# Patient Record
Sex: FEMALE | Race: WHITE | NOT HISPANIC OR LATINO | Employment: FULL TIME | URBAN - METROPOLITAN AREA
[De-identification: names, ages, dates, MRNs, and addresses within clinical notes are randomized per-mention and may not be internally consistent; named-entity substitution may affect disease eponyms.]

---

## 2018-01-10 ENCOUNTER — OFFICE VISIT (OUTPATIENT)
Dept: FAMILY MEDICINE | Facility: CLINIC | Age: 38
End: 2018-01-10
Payer: COMMERCIAL

## 2018-01-10 VITALS
HEART RATE: 88 BPM | DIASTOLIC BLOOD PRESSURE: 82 MMHG | SYSTOLIC BLOOD PRESSURE: 133 MMHG | OXYGEN SATURATION: 97 % | HEIGHT: 63 IN | BODY MASS INDEX: 35.51 KG/M2 | WEIGHT: 200.4 LBS | TEMPERATURE: 98 F

## 2018-01-10 DIAGNOSIS — L73.2 HIDRADENITIS SUPPURATIVA: Primary | ICD-10-CM

## 2018-01-10 DIAGNOSIS — N76.0 BACTERIAL VAGINOSIS: ICD-10-CM

## 2018-01-10 DIAGNOSIS — B96.89 BACTERIAL VAGINOSIS: ICD-10-CM

## 2018-01-10 DIAGNOSIS — N89.8 VAGINAL ODOR: ICD-10-CM

## 2018-01-10 LAB
SPECIMEN SOURCE: ABNORMAL
WET PREP SPEC: ABNORMAL

## 2018-01-10 PROCEDURE — 99203 OFFICE O/P NEW LOW 30 MIN: CPT | Performed by: FAMILY MEDICINE

## 2018-01-10 PROCEDURE — 87210 SMEAR WET MOUNT SALINE/INK: CPT | Performed by: FAMILY MEDICINE

## 2018-01-10 RX ORDER — METRONIDAZOLE 7.5 MG/G
1 GEL VAGINAL AT BEDTIME
Qty: 70 G | Refills: 0 | Status: SHIPPED | OUTPATIENT
Start: 2018-01-10 | End: 2018-01-15

## 2018-01-10 RX ORDER — DOXYCYCLINE HYCLATE 100 MG
100 TABLET ORAL 2 TIMES DAILY
Qty: 14 TABLET | Refills: 0 | Status: SHIPPED | OUTPATIENT
Start: 2018-01-10 | End: 2018-01-17

## 2018-01-10 NOTE — MR AVS SNAPSHOT
After Visit Summary   1/10/2018    Esther Nair    MRN: [id number]           Patient Information     Date Of Birth          1980        Visit Information        Provider Department      1/10/2018 2:00 PM Beba Oneal MD [facility]        Today's Diagnoses     Hidradenitis suppurativa    -  1    Vaginal odor          Care Instructions      Understanding Hidradenitis Suppurativa  Hidradenitis suppurativa is a long-term (chronic) skin disease. It causes painful bumps and sores (abscesses) to form around a hair follicle. Follicles are the tiny holes from which hair grows out of your skin. The disease occurs on parts of the body where skin rubs together. It most often appears in the armpits, the groin area, and under the breasts. It is more common in women.  How to say it  [id number]   What causes hidradenitis suppurativa?  This skin disease happens when hair follicles become clogged with keratin. Keratin is the protein that makes up your hair and nails. The follicles then burst and become infected. Pressure or rubbing on the skin can clog the follicles. Or it can further irritate them.  The disease tends to run in families. It s also more likely to occur in people who are obese, have diabetes, or smoke. Hormones may also play a part.  Symptoms of hidradenitis suppurativa  This skin disease causes one or more painful red bumps on the skin. These bumps become infected and drain pus. They may also itch or burn. In severe cases, sinus tracts may form. These are narrow channels that run under the skin. Blood or a bad-smelling pus may ooze from these bumps or sinus tracts. Bands of scarring often occur.  Treatment for hidradenitis suppurativa  Treatment for this skin disease is most successful when started early. But it may be hard to diagnose. It may be mistaken for other skin conditions. The painful bumps also often return. So stopping new bumps and limiting  scarring is important. Treatment options include:    Warm compress. Putting a warm, wet washcloth on the affected skin may help.    Lifestyle changes. Your symptoms may get better if you lose weight or stop smoking, if needed. Also avoid shaving or other irritants, such as deodorant or perfume.    Antibiotics. For mild cases, an antibiotic for the skin (topical) may help. You may need oral antibiotics if you have a severe case. They can help prevent further infection.    Other oral medicines. Over-the-counter pain medicines can ease pain and inflammation. You may need stronger medicines for a severe case. These medicines include corticosteroids or a retinoid. These may cause side effects.    Injected medicines. A steroid may be injected into the bump to ease pain. A biologic may be injected to ease severe symptoms.    Surgery. Surgery can drain and remove the painful bumps. For severe cases, the doctor may cut out the entire area of affected skin or destroy it with a laser.  Complications of hidradenitis suppurativa  These include:    Arthritis    Depression    Lymphedema    Scarring of skin    Skin cancer  When to call your healthcare provider  Call your healthcare provider right away if you have any of these:    Fever of 100.4 F (38 C) or higher, or as directed    Redness, swelling, or fluid leaking from your rash that gets worse    Pain that gets worse    Symptoms that don t get better, or get worse    New symptoms   Date Last Reviewed: 5/1/2016 2000-2017 [employer]. [address] [place] [address]. All rights reserved. This information is not intended as a substitute for professional medical care. Always follow your healthcare professional's instructions.                Follow-ups after your visit        Additional Services     DERMATOLOGY REFERRAL       Your provider has referred you to: FMASHWIN: [facility] [phone]    [email or web address]    Please be aware that coverage of these services is subject to the terms and limitations of your health insurance plan.  Call member services at your health plan with any benefit or coverage questions.      Please bring the following with you to your appointment:    (1) Any X-Rays, CTs or MRIs which have been performed.  Contact the facility where they were done to arrange for  prior to your scheduled appointment.    (2) List of current medications  (3) This referral request   (4) Any documents/labs given to you for this referral                  Follow-up notes from your care team     Return if symptoms worsen or fail to improve.      Who to contact     Normal or non-critical lab and imaging results will be communicated to you by Luxodohart, letter or phone within 4 business days after the clinic has received the results. If you do not hear from us within 7 days, please contact the clinic through MyChart or phone. If you have a critical or abnormal lab result, we will notify you by phone as soon as possible.  Submit refill requests through [employer] or call your pharmacy and they will forward the refill request to us. Please allow 3 business days for your refill to be completed.          If you need to speak with a  for additional information , please call: [phone]             Additional Information About Your Visit        [employer] Information     [employer] lets you send messages to your doctor, view your test results, renew your prescriptions, schedule appointments and more. To sign up, go to [email or web address] . Click on \"Log in\" on the left side of the screen, which will take you to the Welcome page. Then click on \"Sign up Now\" on the right side of the page.     You will be asked to enter the access code listed below, as well as some personal information. Please follow the directions to create your username and password.     Your access code " "is: [email or web address]  Expires: 4/10/2018  2:51 PM     Your access code will  in 90 days. If you need help or a new code, please call your [place] clinic or [phone].        Care EveryWhere ID     This is your Care EveryWhere ID. This could be used by other organizations to access your [place] medical records  [id number]        Your Vitals Were     Pulse Temperature Height Last Period Pulse Oximetry Breastfeeding?    88 98  F (36.7  C) (Tympanic) 5' 3\" (1.6 m) 2018 (Exact Date) 97% No    BMI (Body Mass Index)                   35.5 kg/m2            Blood Pressure from Last 3 Encounters:   01/10/18 133/82    Weight from Last 3 Encounters:   01/10/18 200 lb 6.4 oz (90.9 kg)              We Performed the Following     DERMATOLOGY REFERRAL     Wet prep          Today's Medication Changes          These changes are accurate as of: 1/10/18  2:51 PM.  If you have any questions, ask your nurse or doctor.               Start taking these medicines.        Dose/Directions    doxycycline 100 MG tablet   Commonly known as:  VIBRA-TABS   Used for:  Hidradenitis suppurativa   Started by:  Beba Oneal MD        Dose:  100 mg   Take 1 tablet (100 mg) by mouth 2 times daily for 7 days   Quantity:  14 tablet   Refills:  0            Where to get your medicines      These medications were sent to [facility] [address] AT [address] [facility] [address]     Phone:  [phone]     doxycycline 100 MG tablet                Primary Care Provider Office Phone # Fax #    [facility] [phone]       [address] [facility] [address]        Equal Access to Services     KEON GÓMEZ AH: Christina Powell, wacristiana anaya, qamaurice kaalmada julian, candi pulido So [facility] [phone].    ATENCIÓN: Si habla español, tiene a mora disposición servicios gratuitos de asistencia " lingüística. Claudine al [phone].    We comply with applicable federal civil rights laws and [place] laws. We do not discriminate on the basis of race, color, national origin, age, disability, sex, sexual orientation, or gender identity.            Thank you!     Thank you for choosing [facility]  for your care. Our goal is always to provide you with excellent care. Hearing back from our patients is one way we can continue to improve our services. Please take a few minutes to complete the written survey that you may receive in the mail after your visit with us. Thank you!             Your Updated Medication List - Protect others around you: Learn how to safely use, store and throw away your medicines at www.disposemymeds.org.          This list is accurate as of: 1/10/18  2:51 PM.  Always use your most recent med list.                   Brand Name Dispense Instructions for use Diagnosis    doxycycline 100 MG tablet    VIBRA-TABS    14 tablet    Take 1 tablet (100 mg) by mouth 2 times daily for 7 days    Hidradenitis suppurativa

## 2018-01-10 NOTE — PATIENT INSTRUCTIONS
Understanding Hidradenitis Suppurativa  Hidradenitis suppurativa is a long-term (chronic) skin disease. It causes painful bumps and sores (abscesses) to form around a hair follicle. Follicles are the tiny holes from which hair grows out of your skin. The disease occurs on parts of the body where skin rubs together. It most often appears in the armpits, the groin area, and under the breasts. It is more common in women.  How to say it  [id number]-NY-tis [id number]-ur-uh-BRANDI-vuh   What causes hidradenitis suppurativa?  This skin disease happens when hair follicles become clogged with keratin. Keratin is the protein that makes up your hair and nails. The follicles then burst and become infected. Pressure or rubbing on the skin can clog the follicles. Or it can further irritate them.  The disease tends to run in families. It s also more likely to occur in people who are obese, have diabetes, or smoke. Hormones may also play a part.  Symptoms of hidradenitis suppurativa  This skin disease causes one or more painful red bumps on the skin. These bumps become infected and drain pus. They may also itch or burn. In severe cases, sinus tracts may form. These are narrow channels that run under the skin. Blood or a bad-smelling pus may ooze from these bumps or sinus tracts. Bands of scarring often occur.  Treatment for hidradenitis suppurativa  Treatment for this skin disease is most successful when started early. But it may be hard to diagnose. It may be mistaken for other skin conditions. The painful bumps also often return. So stopping new bumps and limiting scarring is important. Treatment options include:    Warm compress. Putting a warm, wet washcloth on the affected skin may help.    Lifestyle changes. Your symptoms may get better if you lose weight or stop smoking, if needed. Also avoid shaving or other irritants, such as deodorant or perfume.    Antibiotics. For mild cases, an antibiotic for the skin (topical) may help. You  may need oral antibiotics if you have a severe case. They can help prevent further infection.    Other oral medicines. Over-the-counter pain medicines can ease pain and inflammation. You may need stronger medicines for a severe case. These medicines include corticosteroids or a retinoid. These may cause side effects.    Injected medicines. A steroid may be injected into the bump to ease pain. A biologic may be injected to ease severe symptoms.    Surgery. Surgery can drain and remove the painful bumps. For severe cases, the doctor may cut out the entire area of affected skin or destroy it with a laser.  Complications of hidradenitis suppurativa  These include:    Arthritis    Depression    Lymphedema    Scarring of skin    Skin cancer  When to call your healthcare provider  Call your healthcare provider right away if you have any of these:    Fever of 100.4 F (38 C) or higher, or as directed    Redness, swelling, or fluid leaking from your rash that gets worse    Pain that gets worse    Symptoms that don t get better, or get worse    New symptoms   Date Last Reviewed: 5/1/2016 2000-2017 [employer]. [address] [place] [address], [place] [address]. All rights reserved. This information is not intended as a substitute for professional medical care. Always follow your healthcare professional's instructions.

## 2018-01-10 NOTE — PROGRESS NOTES
SUBJECTIVE:   Esther Nair is a 37 year old female who presents to clinic today for the following health issues:    New patient to the clinic.   No previous medical records.     Derm Problem   Onset: x1 year    Description:   Location: upper legs, buttocks, lower abdomen  Character: boil-like masses, drainage, very painful  Itching (Pruritis): YES- when healing    Progression of Symptoms:  worsening    Accompanying Signs & Symptoms:  Fever: no   Body aches or joint pain: no   Sore throat symptoms: no   Recent cold symptoms: no     History:   Previous similar rash: YES- never fully heals     Precipitating factors:   Exposure to similar rash: no   New exposures: None   Recent travel: no     Alleviating factors:  3 in 1 cream     Therapies Tried and outcome: was previously seen at [facility] for this was given a cream (unsure of name) she did not use it.  Will typically use a OTC 3 in 1 cream for flares.       Vaginal Symptoms  Chronic issues with vaginal odor.  Does not currently have any discharge but does at times.  No urinary symptoms. Previously tried refresh and has also been given antibiotics for it as well but odor will return shortly after completing.         Problem list and histories reviewed & adjusted, as indicated.  Additional history: as documented    Patient Active Problem List   Diagnosis     Hidradenitis suppurativa     Vaginal odor     No past surgical history on file.    Social History   Substance Use Topics     Smoking status: Never Smoker     Smokeless tobacco: Never Used     Alcohol use Yes     No family history on file.      Current Outpatient Prescriptions   Medication Sig Dispense Refill     doxycycline (VIBRA-TABS) 100 MG tablet Take 1 tablet (100 mg) by mouth 2 times daily for 7 days 14 tablet 0     Not on File      Reviewed and updated as needed this visit by clinical staff     Reviewed and updated as needed this visit by Provider         ROS:  Constitutional, HEENT,  "cardiovascular, pulmonary, gi and gu systems are negative, except as otherwise noted.      OBJECTIVE:   /82  Pulse 88  Temp 98  F (36.7  C) (Tympanic)  Ht 5' 3\" (1.6 m)  Wt 200 lb 6.4 oz (90.9 kg)  LMP 01/01/2018 (Exact Date)  SpO2 97%  Breastfeeding? No  BMI 35.5 kg/m2  Body mass index is 35.5 kg/(m^2).  GENERAL: healthy, alert and no distress  ABDOMEN: soft, nontender, no hepatosplenomegaly, no masses and bowel sounds normal   (female): normal female external genitalia, normal urethral meatus, vaginal mucosa, normal cervix/adnexa/uterus without masses. Minimal whitish discharge.  SKIN: Multiple inflammatory nodules with scarring in various stages of healing in the inguinal, bilateral inner thigh, perianal and perineal region. No draining sinus tracts.     Diagnostic Test Results:  Wet prep in process    ASSESSMENT/PLAN:     Esther was seen today for derm problem.    Diagnoses and all orders for this visit:    Hidradenitis suppurativa  Patient education and Handout given  -     doxycycline (VIBRA-TABS) 100 MG tablet; Take 1 tablet (100 mg) by mouth 2 times daily for 7 days  -     DERMATOLOGY REFERRAL    Vaginal odor  -     Wet prep      Bacterial vaginosis  -     metroNIDAZOLE (METROGEL) 0.75 % vaginal gel; Place 1 applicator (5 g) vaginally At Bedtime for 5 days         Follow up if symptoms fail to improve or worsen.      The patient was in agreement with the plan today and had no questions or concerns prior to leaving the clinic.        Schedule a Physical Exam at earliest convenience.       Beba Oneal MD  [facility]  "

## 2018-07-24 ENCOUNTER — OFFICE VISIT (OUTPATIENT)
Dept: DERMATOLOGY | Facility: CLINIC | Age: 38
End: 2018-07-24
Payer: COMMERCIAL

## 2018-07-24 VITALS — SYSTOLIC BLOOD PRESSURE: 121 MMHG | OXYGEN SATURATION: 99 % | HEART RATE: 88 BPM | DIASTOLIC BLOOD PRESSURE: 78 MMHG

## 2018-07-24 DIAGNOSIS — L73.2 HIDRADENITIS SUPPURATIVA: Primary | ICD-10-CM

## 2018-07-24 PROCEDURE — 99203 OFFICE O/P NEW LOW 30 MIN: CPT | Performed by: DERMATOLOGY

## 2018-07-24 RX ORDER — DOXYCYCLINE 100 MG/1
CAPSULE ORAL
Qty: 60 CAPSULE | Refills: 3 | Status: SHIPPED | OUTPATIENT
Start: 2018-07-24 | End: 2018-10-24

## 2018-07-24 RX ORDER — CLINDAMYCIN AND BENZOYL PEROXIDE 10; 50 MG/G; MG/G
GEL TOPICAL 2 TIMES DAILY
Qty: 50 G | Refills: 11 | Status: SHIPPED | OUTPATIENT
Start: 2018-07-24 | End: 2021-09-10

## 2018-07-24 NOTE — NURSING NOTE
"Initial /78 (BP Location: Left arm, Patient Position: Sitting, Cuff Size: Adult Large)  Pulse 88  SpO2 99% Estimated body mass index is 35.5 kg/(m^2) as calculated from the following:    Height as of 1/10/18: 1.6 m (5' 3\").    Weight as of 1/10/18: 90.9 kg (200 lb 6.4 oz). .      "

## 2018-07-24 NOTE — MR AVS SNAPSHOT
After Visit Summary   7/24/2018    Esther Nair    MRN: [id number]           Patient Information     Date Of Birth          1980        Visit Information        Provider Department      7/24/2018 1:45 PM Petey Pradhan MD [facility]        Today's Diagnoses     Hidradenitis suppurativa    -  1       Follow-ups after your visit        Your next 10 appointments already scheduled     Oct 24, 2018  2:30 PM CDT   Return Visit with Petey Pradhan MD   [facility]    [address] [facility] [address]   [phone]              Who to contact     If you have questions or need follow up information about today's clinic visit or your schedule please contact [facility] directly at [phone].  Normal or non-critical lab and imaging results will be communicated to you by MyChart, letter or phone within 4 business days after the clinic has received the results. If you do not hear from us within 7 days, please contact the clinic through MyChart or phone. If you have a critical or abnormal lab result, we will notify you by phone as soon as possible.  Submit refill requests through [employer] or call your pharmacy and they will forward the refill request to us. Please allow 3 business days for your refill to be completed.          Additional Information About Your Visit        Care EveryWhere ID     This is your Care EveryWhere ID. This could be used by other organizations to access your [place] medical records  [id number]        Your Vitals Were     Pulse Pulse Oximetry                88 99%           Blood Pressure from Last 3 Encounters:   07/24/18 121/78   01/10/18 133/82    Weight from Last 3 Encounters:   01/10/18 90.9 kg (200 lb 6.4 oz)              Today, you had the following     No orders found for display         Today's Medication Changes          These changes are accurate as of 7/24/18  2:03 PM.   If you have any questions, ask your nurse or doctor.               Start taking these medicines.        Dose/Directions    chlorhexidine 4 % liquid   Commonly known as:  HIBICLENS   Used for:  Hidradenitis suppurativa   Started by:  Petey Pradhan MD        Wash groin and ab daily   Quantity:  946 mL   Refills:  11       clindamycin-benzoyl peroxide gel   Commonly known as:  BENZACLIN   Used for:  Hidradenitis suppurativa   Started by:  Petey Pradhan MD        Apply topically 2 times daily   Quantity:  50 g   Refills:  11       doxycycline monohydrate 100 MG capsule   Used for:  Hidradenitis suppurativa   Started by:  Petey Pradhan MD        One pill by mouth twice daily   Quantity:  60 capsule   Refills:  3            Where to get your medicines      These medications were sent to [facility] [address] [place] [address]  AT [address] , [facility] [address]     Phone:  [phone]     chlorhexidine 4 % liquid    clindamycin-benzoyl peroxide gel    doxycycline monohydrate 100 MG capsule                Primary Care Provider Office Phone # Fax #    [facility] [phone]       [address] [facility] [address]        Equal Access to Services     SAE GÓMEZ AH: Hadii aad ku hadasho Soomaali, waaxda luqadaha, qaybta kaalmada adeegyada, waxay idiin hayadelinen adepreeti rader. So [facility] [phone].    ATENCIÓN: Si habla español, tiene a mora disposición servicios gratuitos de asistencia lingüística. Llame al [phone].    We comply with applicable federal civil rights laws and [place] laws. We do not discriminate on the basis of race, color, national origin, age, disability, sex, sexual orientation, or gender identity.            Thank you!     Thank you for choosing [facility]  for your care. Our goal is always to provide you with excellent care. Hearing back from our patients  is one way we can continue to improve our services. Please take a few minutes to complete the written survey that you may receive in the mail after your visit with us. Thank you!             Your Updated Medication List - Protect others around you: Learn how to safely use, store and throw away your medicines at www.disposemymeds.org.          This list is accurate as of 7/24/18  2:03 PM.  Always use your most recent med list.                   Brand Name Dispense Instructions for use Diagnosis    chlorhexidine 4 % liquid    HIBICLENS    946 mL    Wash groin and ab daily    Hidradenitis suppurativa       clindamycin-benzoyl peroxide gel    BENZACLIN    50 g    Apply topically 2 times daily    Hidradenitis suppurativa       doxycycline monohydrate 100 MG capsule     60 capsule    One pill by mouth twice daily    Hidradenitis suppurativa

## 2018-07-24 NOTE — PROGRESS NOTES
Esther Nair is a 38 year old year old female patient here today for rash on groin.   .  Patient states this has been present for 5 years.  Patient reports the following symptoms:  Pimples.  She does smoke.  .  Patient reports the following previous treatments none.  Patient reports the following modifying factors none.  Associated symptoms: none.  Patient has no other skin complaints today.  Remainder of the HPI, Meds, PMH, Allergies, FH, and SH was reviewed in chart.    No past medical history on file.    No past surgical history on file.     Family History   Problem Relation Age of Onset     Melanoma No family hx of        Social History     Social History     Marital status: Single     Spouse name: N/A     Number of children: N/A     Years of education: N/A     Occupational History     Not on file.     Social History Main Topics     Smoking status: Never Smoker     Smokeless tobacco: Never Used     Alcohol use Yes     Drug use: No     Sexual activity: Yes     Partners: Male     Birth control/ protection: Condom, Female Surgical     Other Topics Concern     Not on file     Social History Narrative       Outpatient Encounter Prescriptions as of 7/24/2018   Medication Sig Dispense Refill     chlorhexidine (HIBICLENS) 4 % liquid Wash groin and ab daily 946 mL 11     clindamycin-benzoyl peroxide (BENZACLIN) gel Apply topically 2 times daily 50 g 11     doxycycline monohydrate 100 MG capsule One pill by mouth twice daily 60 capsule 3     No facility-administered encounter medications on file as of 7/24/2018.              Review Of Systems  Skin: As above  Eyes: negative  Ears/Nose/Throat: negative  Respiratory: No shortness of breath, dyspnea on exertion, cough, or hemoptysis  Cardiovascular: negative  Gastrointestinal: negative  Genitourinary: negative  Musculoskeletal: negative  Neurologic: negative  Psychiatric: negative  Hematologic/Lymphatic/Immunologic: negative  Endocrine: negative      O:   NAD, WDWN,  Alert & Oriented, Mood & Affect wnl, Vitals stable   Here today alone   /78 (BP Location: Left arm, Patient Position: Sitting, Cuff Size: Adult Large)  Pulse 88  SpO2 99%   General appearance normal   Vitals stable   Alert, oriented and in no acute distress     Inflammatory papules and nodules on ab and groin    No axilla lesions       The remainder of expanded problem focused exam was unremarkable; the following areas were examined:  scalp/hair, conjunctiva/lids, face, neck, lips, chest, digits/nails, RUE, LUE.      Eyes: Conjunctivae/lids:Normal     ENT: Lips, buccal mucosa, tongue: normal    MSK:Normal    Cardiovascular: peripheral edema none    Pulm: Breathing Normal    Lymph Nodes: No Head and Neck Lymphadenopathy     Neuro/Psych: Orientation:Normal; Mood/Affect:Normal      A/P:  1. Hidradenitis   Pathophysiology discussed with pateint   discontinue smoking discussed with patient   Weight loss w   Doxy twice daily  hibiclens daily DO NOT PUT ON FACE  benzalcin twice daily  Return to clinic 3 months  Skin care regimen reviewed with patient: Eliminate harsh soaps, i.e. Dial, zest, irsih spring; Mild soaps such as Cetaphil or Dove sensitive skin, avoid hot or cold showers, aggressive use of emollients including vanicream, cetaphil or cerave discussed with patient.

## 2018-07-24 NOTE — LETTER
7/24/2018         RE: Esther Nair  [address] [place] [address]        Dear Colleague,    Thank you for referring your patient, Esther Nair, to the [facility]. Please see a copy of my visit note below.    Esther Nair is a 38 year old year old female patient here today for rash on groin.   .  Patient states this has been present for 5 years.  Patient reports the following symptoms:  Pimples.  She does smoke.  .  Patient reports the following previous treatments none.  Patient reports the following modifying factors none.  Associated symptoms: none.  Patient has no other skin complaints today.  Remainder of the HPI, Meds, PMH, Allergies, FH, and SH was reviewed in chart.    No past medical history on file.    No past surgical history on file.     Family History   Problem Relation Age of Onset     Melanoma No family hx of        Social History     Social History     Marital status: Single     Spouse name: N/A     Number of children: N/A     Years of education: N/A     Occupational History     Not on file.     Social History Main Topics     Smoking status: Never Smoker     Smokeless tobacco: Never Used     Alcohol use Yes     Drug use: No     Sexual activity: Yes     Partners: Male     Birth control/ protection: Condom, Female Surgical     Other Topics Concern     Not on file     Social History Narrative       Outpatient Encounter Prescriptions as of 7/24/2018   Medication Sig Dispense Refill     chlorhexidine (HIBICLENS) 4 % liquid Wash groin and ab daily 946 mL 11     clindamycin-benzoyl peroxide (BENZACLIN) gel Apply topically 2 times daily 50 g 11     doxycycline monohydrate 100 MG capsule One pill by mouth twice daily 60 capsule 3     No facility-administered encounter medications on file as of 7/24/2018.              Review Of Systems  Skin: As above  Eyes: negative  Ears/Nose/Throat: negative  Respiratory: No shortness of breath, dyspnea on exertion, cough, or  hemoptysis  Cardiovascular: negative  Gastrointestinal: negative  Genitourinary: negative  Musculoskeletal: negative  Neurologic: negative  Psychiatric: negative  Hematologic/Lymphatic/Immunologic: negative  Endocrine: negative      O:   NAD, WDWN, Alert & Oriented, Mood & Affect wnl, Vitals stable   Here today alone   /78 (BP Location: Left arm, Patient Position: Sitting, Cuff Size: Adult Large)  Pulse 88  SpO2 99%   General appearance normal   Vitals stable   Alert, oriented and in no acute distress     Inflammatory papules and nodules on ab and groin    No axilla lesions       The remainder of expanded problem focused exam was unremarkable; the following areas were examined:  scalp/hair, conjunctiva/lids, face, neck, lips, chest, digits/nails, RUE, LUE.      Eyes: Conjunctivae/lids:Normal     ENT: Lips, buccal mucosa, tongue: normal    MSK:Normal    Cardiovascular: peripheral edema none    Pulm: Breathing Normal    Lymph Nodes: No Head and Neck Lymphadenopathy     Neuro/Psych: Orientation:Normal; Mood/Affect:Normal      A/P:  1. Hidradenitis   Pathophysiology discussed with pateint   discontinue smoking discussed with patient   Weight loss w   Doxy twice daily  hibiclens daily DO NOT PUT ON FACE  benzalcin twice daily  Return to clinic 3 months  Skin care regimen reviewed with patient: Eliminate harsh soaps, i.e. Dial, zest, irsih spring; Mild soaps such as Cetaphil or Dove sensitive skin, avoid hot or cold showers, aggressive use of emollients including vanicream, cetaphil or cerave discussed with patient.        Again, thank you for allowing me to participate in the care of your patient.        Sincerely,        Petey Pradhan MD

## 2018-08-07 ENCOUNTER — HEALTH MAINTENANCE LETTER (OUTPATIENT)
Age: 38
End: 2018-08-07

## 2018-10-24 ENCOUNTER — OFFICE VISIT (OUTPATIENT)
Dept: DERMATOLOGY | Facility: CLINIC | Age: 38
End: 2018-10-24
Payer: COMMERCIAL

## 2018-10-24 VITALS — SYSTOLIC BLOOD PRESSURE: 119 MMHG | HEART RATE: 86 BPM | DIASTOLIC BLOOD PRESSURE: 76 MMHG | OXYGEN SATURATION: 98 %

## 2018-10-24 DIAGNOSIS — L73.2 HIDRADENITIS SUPPURATIVA: Primary | ICD-10-CM

## 2018-10-24 PROCEDURE — 99213 OFFICE O/P EST LOW 20 MIN: CPT | Performed by: DERMATOLOGY

## 2018-10-24 RX ORDER — DOXYCYCLINE 100 MG/1
CAPSULE ORAL
Qty: 60 CAPSULE | Refills: 3 | Status: SHIPPED | OUTPATIENT
Start: 2018-10-24 | End: 2019-11-13

## 2018-10-24 NOTE — PROGRESS NOTES
Esther Nair is a 38 year old year old female patient here today for f/u hidradenitis.  Was given doxy, hibiclens and benzaclin LOV.  Benzaclin too much money, she didn't get.  Doing great no issues, no lesions. Patient reports the following modifying factors none.  Associated symptoms: none.  Patient has no other skin complaints today.  Remainder of the HPI, Meds, PMH, Allergies, FH, and SH was reviewed in chart.    No past medical history on file.    No past surgical history on file.     Family History   Problem Relation Age of Onset     Melanoma No family hx of        Social History     Social History     Marital status: Single     Spouse name: N/A     Number of children: N/A     Years of education: N/A     Occupational History     Not on file.     Social History Main Topics     Smoking status: Never Smoker     Smokeless tobacco: Never Used     Alcohol use Yes     Drug use: No     Sexual activity: Yes     Partners: Male     Birth control/ protection: Condom, Female Surgical     Other Topics Concern     Not on file     Social History Narrative       Outpatient Encounter Prescriptions as of 10/24/2018   Medication Sig Dispense Refill     chlorhexidine (HIBICLENS) 4 % liquid Wash groin and ab daily 946 mL 11     clindamycin-benzoyl peroxide (BENZACLIN) gel Apply topically 2 times daily 50 g 11     doxycycline monohydrate 100 MG capsule One pill by mouth twice daily 60 capsule 3     No facility-administered encounter medications on file as of 10/24/2018.              Review Of Systems  Skin: As above  Eyes: negative  Ears/Nose/Throat: negative  Respiratory: No shortness of breath, dyspnea on exertion, cough, or hemoptysis  Cardiovascular: negative  Gastrointestinal: negative  Genitourinary: negative  Musculoskeletal: negative  Neurologic: negative  Psychiatric: negative  Hematologic/Lymphatic/Immunologic: negative  Endocrine: negative      O:   NAD, WDWN, Alert & Oriented, Mood & Affect wnl, Vitals stable   Here  today alone   There were no vitals taken for this visit.   General appearance normal   Vitals stable   Alert, oriented and in no acute distress      Skin clear per patient     The remainder of expanded problem focused exam was unremarkable; the following areas were examined:  scalp/hair, conjunctiva/lids, face, neck, lips, digits/nails, RUE, LUE.      Eyes: Conjunctivae/lids:Normal     ENT: Lips, buccal mucosa, tongue: normal    MSK:Normal    Cardiovascular: peripheral edema none    Pulm: Breathing Normal    Neuro/Psych: Orientation:Normal; Mood/Affect:Normal      A/P:  1. Hidradenitis   Pathophysiology discussed with pateint   discontinue smoking discussed with patient   Weight loss w   Doxy decrease to once daily  hibiclens daily   Return to clinic 3 months  Skin care regimen reviewed with patient: Eliminate harsh soaps, i.e. Dial, zest, irsih spring; Mild soaps such as Cetaphil or Dove sensitive skin, avoid hot or cold showers, aggressive use of emollients including vanicream, cetaphil or cerave discussed with patient.

## 2018-10-24 NOTE — MR AVS SNAPSHOT
"              After Visit Summary   10/24/2018    Esther Nair    MRN: [id number]           Patient Information     Date Of Birth          1980        Visit Information        Provider Department      10/24/2018 2:30 PM Petey Pradhan MD [facility]        Today's Diagnoses     Hidradenitis suppurativa    -  1       Follow-ups after your visit        Who to contact     If you have questions or need follow up information about today's clinic visit or your schedule please contact [facility] directly at [phone].  Normal or non-critical lab and imaging results will be communicated to you by Ubertestershart, letter or phone within 4 business days after the clinic has received the results. If you do not hear from us within 7 days, please contact the clinic through [employer] or phone. If you have a critical or abnormal lab result, we will notify you by phone as soon as possible.  Submit refill requests through [employer] or call your pharmacy and they will forward the refill request to us. Please allow 3 business days for your refill to be completed.          Additional Information About Your Visit        MyChart Information     [employer] lets you send messages to your doctor, view your test results, renew your prescriptions, schedule appointments and more. To sign up, go to [email or web address] . Click on \"Log in\" on the left side of the screen, which will take you to the Welcome page. Then click on \"Sign up Now\" on the right side of the page.     You will be asked to enter the access code listed below, as well as some personal information. Please follow the directions to create your username and password.     Your access code is: [email or web address]  Expires: 2019  2:50 PM     Your access code will  in 90 days. If you need help or a new code, please call your [facility] or [phone].        Care EveryWhere ID     This is your Care EveryWhere ID. This could be used by " other organizations to access your [place] medical records  [id number]        Your Vitals Were     Pulse Pulse Oximetry                86 98%           Blood Pressure from Last 3 Encounters:   10/24/18 119/76   07/24/18 121/78   01/10/18 133/82    Weight from Last 3 Encounters:   01/10/18 90.9 kg (200 lb 6.4 oz)              Today, you had the following     No orders found for display         Today's Medication Changes          These changes are accurate as of 10/24/18  2:50 PM.  If you have any questions, ask your nurse or doctor.               These medicines have changed or have updated prescriptions.        Dose/Directions    doxycycline monohydrate 100 MG capsule   This may have changed:  additional instructions   Used for:  Hidradenitis suppurativa   Changed by:  Petey Pradhan MD        One pill by mouth daily   Quantity:  60 capsule   Refills:  3            Where to get your medicines      These medications were sent to [facility] [address] [place] [address]  AT [address] [facility] [address]     Phone:  [phone]     doxycycline monohydrate 100 MG capsule                Primary Care Provider Office Phone # Fax #    Beba Oneal [phone]       [address] [place] [address]        Equal Access to Services     SAE GÓMEZ AH: Hadii aad ku hadasho Soomaali, waaxda luqadaha, qaybta kaalmada adeegyada, waxay idiin hayaan mo kharagenny rader. So [facility] [phone].    ATENCIÓN: Si habla español, tiene a mora disposición servicios gratuitos de asistencia lingüística. [facility] [phone].    We comply with applicable federal civil rights laws and [place] laws. We do not discriminate on the basis of race, color, national origin, age, disability, sex, sexual orientation, or gender identity.            Thank you!     Thank you for choosing [facility]  for your care. Our goal is always to  provide you with excellent care. Hearing back from our patients is one way we can continue to improve our services. Please take a few minutes to complete the written survey that you may receive in the mail after your visit with us. Thank you!             Your Updated Medication List - Protect others around you: Learn how to safely use, store and throw away your medicines at www.disposemymeds.org.          This list is accurate as of 10/24/18  2:50 PM.  Always use your most recent med list.                   Brand Name Dispense Instructions for use Diagnosis    chlorhexidine 4 % liquid    HIBICLENS    946 mL    Wash groin and ab daily    Hidradenitis suppurativa       clindamycin-benzoyl peroxide gel    BENZACLIN    50 g    Apply topically 2 times daily    Hidradenitis suppurativa       doxycycline monohydrate 100 MG capsule     60 capsule    One pill by mouth daily    Hidradenitis suppurativa

## 2018-10-24 NOTE — LETTER
10/24/2018         RE: Esther Nair  [address] [place] [address]        Dear Colleague,    Thank you for referring your patient, Esther Nair, to the [facility]. Please see a copy of my visit note below.    Esther Nair is a 38 year old year old female patient here today for f/u hidradenitis.  Was given doxy, hibiclens and benzaclin LOV.  Benzaclin too much money, she didn't get.  Doing great no issues, no lesions. Patient reports the following modifying factors none.  Associated symptoms: none.  Patient has no other skin complaints today.  Remainder of the HPI, Meds, PMH, Allergies, FH, and SH was reviewed in chart.    No past medical history on file.    No past surgical history on file.     Family History   Problem Relation Age of Onset     Melanoma No family hx of        Social History     Social History     Marital status: Single     Spouse name: N/A     Number of children: N/A     Years of education: N/A     Occupational History     Not on file.     Social History Main Topics     Smoking status: Never Smoker     Smokeless tobacco: Never Used     Alcohol use Yes     Drug use: No     Sexual activity: Yes     Partners: Male     Birth control/ protection: Condom, Female Surgical     Other Topics Concern     Not on file     Social History Narrative       Outpatient Encounter Prescriptions as of 10/24/2018   Medication Sig Dispense Refill     chlorhexidine (HIBICLENS) 4 % liquid Wash groin and ab daily 946 mL 11     clindamycin-benzoyl peroxide (BENZACLIN) gel Apply topically 2 times daily 50 g 11     doxycycline monohydrate 100 MG capsule One pill by mouth twice daily 60 capsule 3     No facility-administered encounter medications on file as of 10/24/2018.              Review Of Systems  Skin: As above  Eyes: negative  Ears/Nose/Throat: negative  Respiratory: No shortness of breath, dyspnea on exertion, cough, or hemoptysis  Cardiovascular: negative  Gastrointestinal:  negative  Genitourinary: negative  Musculoskeletal: negative  Neurologic: negative  Psychiatric: negative  Hematologic/Lymphatic/Immunologic: negative  Endocrine: negative      O:   NAD, WDWN, Alert & Oriented, Mood & Affect wnl, Vitals stable   Here today alone   There were no vitals taken for this visit.   General appearance normal   Vitals stable   Alert, oriented and in no acute distress      Skin clear per patient     The remainder of expanded problem focused exam was unremarkable; the following areas were examined:  scalp/hair, conjunctiva/lids, face, neck, lips, digits/nails, RUE, LUE.      Eyes: Conjunctivae/lids:Normal     ENT: Lips, buccal mucosa, tongue: normal    MSK:Normal    Cardiovascular: peripheral edema none    Pulm: Breathing Normal    Neuro/Psych: Orientation:Normal; Mood/Affect:Normal      A/P:  1. Hidradenitis   Pathophysiology discussed with pateint   discontinue smoking discussed with patient   Weight loss w   Doxy decrease to once daily  hibiclens daily   Return to clinic 3 months  Skin care regimen reviewed with patient: Eliminate harsh soaps, i.e. Dial, zest, irsih spring; Mild soaps such as Cetaphil or Dove sensitive skin, avoid hot or cold showers, aggressive use of emollients including vanicream, cetaphil or cerave discussed with patient.      Again, thank you for allowing me to participate in the care of your patient.        Sincerely,        Petey Pradhan MD

## 2019-11-12 DIAGNOSIS — L73.2 HIDRADENITIS SUPPURATIVA: ICD-10-CM

## 2019-11-12 NOTE — TELEPHONE ENCOUNTER
Reason for Call:  Medication or medication refill:    Do you use a [facility]?  Name of the pharmacy and phone number for the current request:  [facility] (Ph: [phone])    Name of the medication requested: Doxycycline    Other request: Pt is out of medication and refills  LOV: 10/24/2018    Can we leave a detailed message on this number? YES    Phone number patient can be reached at: Home number on file [phone] (home)    Best Time: Any    Call taken on 11/12/2019 at 3:35 PM by Denise Behrendt

## 2019-11-12 NOTE — LETTER
[facility]  [address]  [facility] [address]  Phone: [phone]    2019    Esther Nair                                                                                                            [address] [place] [address] [address] [place] [address]            Dear Ms. Nair,    We are concerned about your health care.  We recently provided you with a medication refill.  Many medications require routine follow-up with your Dermatology Provider.      At this time we ask that: You schedule a routine office visit with your Dermatology Provider to follow your Hidradenitis suppurativa. You need to be seen at least annually to renew a prescription.    Your prescription: Is  and has been refilled once so you may have time for the above noted follow-up. Please be seen prior to needing your next refill of medication.     We are currently booking appointments 6-8 weeks in advance.     Thank you,      Petey Pradhan MD/ [facility]

## 2019-11-13 RX ORDER — DOXYCYCLINE 100 MG/1
CAPSULE ORAL
Qty: 60 CAPSULE | Refills: 0 | Status: SHIPPED | OUTPATIENT
Start: 2019-11-13 | End: 2021-09-10

## 2019-11-13 NOTE — TELEPHONE ENCOUNTER
> 1 year. Needs appointment. Letter sent and note sent to pharmacy as well. Isamar Hernandez RN

## 2020-02-12 ENCOUNTER — RX ONLY (RX ONLY)
Age: 40
End: 2020-02-12

## 2020-02-12 ENCOUNTER — APPOINTMENT (OUTPATIENT)
Age: 40
Setting detail: DERMATOLOGY
End: 2020-02-12

## 2020-02-12 VITALS — WEIGHT: 196 LBS | RESPIRATION RATE: 15 BRPM | HEIGHT: 62 IN

## 2020-02-12 DIAGNOSIS — L73.2 HIDRADENITIS SUPPURATIVA: ICD-10-CM

## 2020-02-12 PROCEDURE — OTHER VENIPUNCTURE: OTHER

## 2020-02-12 PROCEDURE — 99203 OFFICE O/P NEW LOW 30 MIN: CPT | Mod: 25

## 2020-02-12 PROCEDURE — 36415 COLL VENOUS BLD VENIPUNCTURE: CPT

## 2020-02-12 PROCEDURE — OTHER ORDER TESTS: OTHER

## 2020-02-12 PROCEDURE — OTHER HUMIRA INITIATION: OTHER

## 2020-02-12 PROCEDURE — OTHER COUNSELING: OTHER

## 2020-02-12 PROCEDURE — OTHER PRESCRIPTION: OTHER

## 2020-02-12 RX ORDER — ADALIMUMAB 40MG/0.4ML
40MG KIT SUBCUTANEOUS
Qty: 3 | Refills: 0 | Status: ERX

## 2020-02-12 RX ORDER — ADALIMUMAB 40MG/0.4ML
40MG KIT SUBCUTANEOUS
Qty: 2 | Refills: 0 | Status: ERX | COMMUNITY
Start: 2020-02-12

## 2020-02-12 ASSESSMENT — LOCATION DETAILED DESCRIPTION DERM
LOCATION DETAILED: RIGHT ANTERIOR PROXIMAL THIGH
LOCATION DETAILED: LEFT ANTERIOR PROXIMAL THIGH
LOCATION DETAILED: RIGHT PROXIMAL POSTERIOR THIGH
LOCATION DETAILED: LEFT PROXIMAL POSTERIOR THIGH
LOCATION DETAILED: PERIUMBILICAL SKIN
LOCATION DETAILED: RIGHT MEDIAL BUTTOCK
LOCATION DETAILED: LEFT LATERAL ABDOMEN

## 2020-02-12 ASSESSMENT — HURLEY STAGE
IN YOUR EXPERIENCE, AMONG ALL PATIENTS YOU HAVE SEEN WITH THIS CONDITION, HOW SEVERE IS THIS PATIENT'S CONDITION?: HURLEY STAGE II: SINGLE OR MULTIPLE, WIDELY SEPARATED RECURRENT ABSCESSES WITH SINUS TRACT FORMATION AND SCARRING

## 2020-02-12 ASSESSMENT — LOCATION ZONE DERM
LOCATION ZONE: TRUNK
LOCATION ZONE: LEG

## 2020-02-12 ASSESSMENT — LOCATION SIMPLE DESCRIPTION DERM
LOCATION SIMPLE: LEFT THIGH
LOCATION SIMPLE: RIGHT BUTTOCK
LOCATION SIMPLE: RIGHT THIGH
LOCATION SIMPLE: ABDOMEN
LOCATION SIMPLE: LEFT POSTERIOR THIGH
LOCATION SIMPLE: RIGHT POSTERIOR THIGH

## 2020-02-12 NOTE — PROCEDURE: COUNSELING
Patient Specific Counseling (Will Not Stick From Patient To Patient): Discussed starting bactrim while awaiting Humira approval. Patient expressed understanding.
Detail Level: Simple

## 2020-02-12 NOTE — HPI: RASH
How Severe Is Your Rash?: severe
Is This A New Presentation, Or A Follow-Up?: Rash
Additional History: Was prescribed doxycycline in past which helped but not significantly, scarring is “horrible”.  Pants rubbing hurts. She reports has a hard time catching breath and coughing with doxycycline which have not resolved but just stopped doxycycline 1 week ago. Has used hibicleans which didnt seem to help.

## 2020-02-12 NOTE — PROCEDURE: HUMIRA INITIATION
Is Hydroxychloroquine Contraindicated?: No
Pregnancy And Lactation Warning Text: This medication is Pregnancy Category B and is considered safe during pregnancy. It is unknown if this medication is excreted in breast milk.
Diagnosis (Required): Hidradenitis Suppurativa
Humira Dosing: 160 mg SC day 1, 80 mg SC day 14, then 40 mg SC every other week starting on day 29
Detail Level: Zone
Humira Monitoring Guidelines: - Discussed that a panel of labs need to be drawn at baseline and monitored periodically. \\n- Additionally, the patient will need to be screened for tuberculosis and Hepatitis B annually including at baseline.\\n- It is important to not take drug holidays unless otherwise instructed as this can affect a patient's ability to recapture the same degree of skin clearance upon restarting.

## 2020-04-22 ENCOUNTER — APPOINTMENT (OUTPATIENT)
Age: 40
Setting detail: DERMATOLOGY
End: 2020-04-22

## 2020-04-22 VITALS — HEIGHT: 62 IN | RESPIRATION RATE: 15 BRPM | WEIGHT: 196 LBS

## 2020-04-22 DIAGNOSIS — L73.2 HIDRADENITIS SUPPURATIVA: ICD-10-CM

## 2020-04-22 PROCEDURE — OTHER ORDER TESTS: OTHER

## 2020-04-22 PROCEDURE — OTHER VENIPUNCTURE: OTHER

## 2020-04-22 PROCEDURE — 36415 COLL VENOUS BLD VENIPUNCTURE: CPT

## 2020-04-22 PROCEDURE — OTHER HUMIRA MONITORING: OTHER

## 2020-04-22 PROCEDURE — 99213 OFFICE O/P EST LOW 20 MIN: CPT | Mod: 25

## 2020-04-22 PROCEDURE — OTHER PRESCRIPTION: OTHER

## 2020-04-22 RX ORDER — ADALIMUMAB 40MG/0.4ML
40MG KIT SUBCUTANEOUS
Qty: 6 | Refills: 0 | Status: ERX

## 2020-04-22 ASSESSMENT — LOCATION DETAILED DESCRIPTION DERM
LOCATION DETAILED: LEFT BUTTOCK
LOCATION DETAILED: LEFT ANTECUBITAL SKIN
LOCATION DETAILED: RIGHT DISTAL POSTERIOR THIGH
LOCATION DETAILED: LEFT PROXIMAL POSTERIOR THIGH
LOCATION DETAILED: RIGHT BUTTOCK
LOCATION DETAILED: PERIUMBILICAL SKIN

## 2020-04-22 ASSESSMENT — LOCATION ZONE DERM
LOCATION ZONE: LEG
LOCATION ZONE: TRUNK
LOCATION ZONE: ARM

## 2020-04-22 ASSESSMENT — LOCATION SIMPLE DESCRIPTION DERM
LOCATION SIMPLE: LEFT BUTTOCK
LOCATION SIMPLE: LEFT UPPER ARM
LOCATION SIMPLE: RIGHT POSTERIOR THIGH
LOCATION SIMPLE: ABDOMEN
LOCATION SIMPLE: LEFT POSTERIOR THIGH
LOCATION SIMPLE: RIGHT BUTTOCK

## 2020-04-22 NOTE — HPI: RASH (HIDRADENITIS SUPPURATIVA)
How Severe Is It?: moderate
Is This A New Presentation, Or A Follow-Up?: Follow Up Hidradenitis Suppurativa
Additional History: Started Humira just over 1 month ago. Pain is less and fewer smaller flares. First injection was March 9, 2020. #0she has lost 20 lbs and has cut back on smoking. Smokes 10 cig per day and use to smoke 20/day. Next injection due in 5 days.

## 2020-04-27 RX ORDER — ADALIMUMAB 40MG/0.4ML
KIT SUBCUTANEOUS
Qty: 6 | Refills: 0 | Status: ERX

## 2020-09-17 ENCOUNTER — APPOINTMENT (OUTPATIENT)
Dept: URBAN - METROPOLITAN AREA CLINIC 252 | Age: 40
Setting detail: DERMATOLOGY
End: 2020-09-17

## 2020-09-17 VITALS — WEIGHT: 184 LBS | RESPIRATION RATE: 14 BRPM | HEIGHT: 62 IN

## 2020-09-17 DIAGNOSIS — L73.2 HIDRADENITIS SUPPURATIVA: ICD-10-CM

## 2020-09-17 PROCEDURE — 99213 OFFICE O/P EST LOW 20 MIN: CPT | Mod: 25

## 2020-09-17 PROCEDURE — OTHER PRESCRIPTION: OTHER

## 2020-09-17 PROCEDURE — OTHER HUMIRA MONITORING: OTHER

## 2020-09-17 PROCEDURE — 36415 COLL VENOUS BLD VENIPUNCTURE: CPT

## 2020-09-17 PROCEDURE — OTHER VENIPUNCTURE: OTHER

## 2020-09-17 PROCEDURE — OTHER ORDER TESTS: OTHER

## 2020-09-17 RX ORDER — ADALIMUMAB 40MG/0.4ML
40MG KIT SUBCUTANEOUS
Qty: 6 | Refills: 0

## 2020-09-17 ASSESSMENT — LOCATION SIMPLE DESCRIPTION DERM
LOCATION SIMPLE: LEFT UPPER ARM
LOCATION SIMPLE: LEFT THIGH
LOCATION SIMPLE: ABDOMEN
LOCATION SIMPLE: RIGHT THIGH

## 2020-09-17 ASSESSMENT — LOCATION DETAILED DESCRIPTION DERM
LOCATION DETAILED: LEFT ANTERIOR PROXIMAL THIGH
LOCATION DETAILED: LEFT ANTECUBITAL SKIN
LOCATION DETAILED: LEFT LATERAL ABDOMEN
LOCATION DETAILED: RIGHT ANTERIOR PROXIMAL THIGH
LOCATION DETAILED: PERIUMBILICAL SKIN

## 2020-09-17 ASSESSMENT — LOCATION ZONE DERM
LOCATION ZONE: ARM
LOCATION ZONE: TRUNK
LOCATION ZONE: LEG

## 2020-09-17 NOTE — HPI: MEDICATION (HUMIRA)
Is This A New Presentation, Or A Follow-Up?: Follow Up Humira
Additional History: Last Humira injection was Aug 31. She has an insurance issue and now 2.5 weks late on injection. Was clear until she ran out of Humira. Lt reports hip arthritis is dramatically improved since starting Humira.

## 2020-09-23 ENCOUNTER — RX ONLY (RX ONLY)
Age: 40
End: 2020-09-23

## 2020-09-23 RX ORDER — ADALIMUMAB 40MG/0.4ML
40MG KIT SUBCUTANEOUS
Qty: 6 | Refills: 0 | Status: ERX

## 2020-12-03 ENCOUNTER — APPOINTMENT (OUTPATIENT)
Dept: URBAN - METROPOLITAN AREA CLINIC 252 | Age: 40
Setting detail: DERMATOLOGY
End: 2020-12-03

## 2020-12-03 VITALS — RESPIRATION RATE: 16 BRPM | WEIGHT: 200 LBS | HEIGHT: 62 IN

## 2020-12-03 DIAGNOSIS — R21 RASH AND OTHER NONSPECIFIC SKIN ERUPTION: ICD-10-CM

## 2020-12-03 DIAGNOSIS — L738 OTHER SPECIFIED DISEASES OF HAIR AND HAIR FOLLICLES: ICD-10-CM

## 2020-12-03 DIAGNOSIS — L663 OTHER SPECIFIED DISEASES OF HAIR AND HAIR FOLLICLES: ICD-10-CM

## 2020-12-03 DIAGNOSIS — L73.2 HIDRADENITIS SUPPURATIVA: ICD-10-CM

## 2020-12-03 PROBLEM — L02.02 FURUNCLE OF FACE: Status: ACTIVE | Noted: 2020-12-03

## 2020-12-03 PROCEDURE — OTHER HUMIRA MONITORING: OTHER

## 2020-12-03 PROCEDURE — 36415 COLL VENOUS BLD VENIPUNCTURE: CPT

## 2020-12-03 PROCEDURE — OTHER VENIPUNCTURE: OTHER

## 2020-12-03 PROCEDURE — OTHER COUNSELING: OTHER

## 2020-12-03 PROCEDURE — OTHER PRESCRIPTION: OTHER

## 2020-12-03 PROCEDURE — OTHER ORDER TESTS: OTHER

## 2020-12-03 PROCEDURE — 99214 OFFICE O/P EST MOD 30 MIN: CPT | Mod: 25

## 2020-12-03 RX ORDER — TRIAMCINOLONE ACETONIDE 5 MG/G
0.5% CREAM TOPICAL BID
Qty: 1 | Refills: 3 | Status: ERX | COMMUNITY
Start: 2020-12-03

## 2020-12-03 RX ORDER — ADALIMUMAB 40MG/0.4ML
40MG KIT SUBCUTANEOUS
Qty: 6 | Refills: 0 | Status: ERX

## 2020-12-03 RX ORDER — CLINDAMYCIN PHOSPHATE 10 MG/ML
1% LOTION TOPICAL BID
Qty: 1 | Refills: 2 | Status: ERX | COMMUNITY
Start: 2020-12-03

## 2020-12-03 ASSESSMENT — LOCATION ZONE DERM
LOCATION ZONE: HAND
LOCATION ZONE: ARM
LOCATION ZONE: FACE
LOCATION ZONE: TRUNK
LOCATION ZONE: LEG

## 2020-12-03 ASSESSMENT — LOCATION DETAILED DESCRIPTION DERM
LOCATION DETAILED: RIGHT ULNAR PALM
LOCATION DETAILED: LEFT INFERIOR MEDIAL MALAR CHEEK
LOCATION DETAILED: LEFT ANTERIOR PROXIMAL THIGH
LOCATION DETAILED: LEFT RADIAL PALM
LOCATION DETAILED: PERIUMBILICAL SKIN
LOCATION DETAILED: LEFT LATERAL ABDOMEN
LOCATION DETAILED: LEFT ANTECUBITAL SKIN
LOCATION DETAILED: RIGHT SUPRAPUBIC SKIN

## 2020-12-03 ASSESSMENT — LOCATION SIMPLE DESCRIPTION DERM
LOCATION SIMPLE: RIGHT HAND
LOCATION SIMPLE: LEFT UPPER ARM
LOCATION SIMPLE: LEFT CHEEK
LOCATION SIMPLE: ABDOMEN
LOCATION SIMPLE: LEFT THIGH
LOCATION SIMPLE: LEFT HAND
LOCATION SIMPLE: GROIN

## 2020-12-03 NOTE — PROCEDURE: COUNSELING
Detail Level: Detailed
Patient Specific Counseling (Will Not Stick From Patient To Patient): Discussed dermatitis vs psoriasis. Discussed in some cases psoriasis can be triggered counterintuitively by biologics such as Humira. Recommended spot treatment with triamcinolone 0.5%.
Detail Level: Simple
Patient Specific Counseling (Will Not Stick From Patient To Patient): Discussed mask acne/folliculitis.

## 2020-12-03 NOTE — HPI: MEDICATION (HUMIRA)
Is This A New Presentation, Or A Follow-Up?: Follow Up Humira
Additional History: Started Humira March 2020 for hidradenitis suppurativa.  She was off for 6 weeks and now back on for 6 weeks.  Started getting mask acne. No flares for over 30 days and was a mild flares. Denies side effects with Humira.

## 2020-12-17 ENCOUNTER — RX ONLY (RX ONLY)
Age: 40
End: 2020-12-17

## 2020-12-17 RX ORDER — SULFAMETHOXAZOLE AND TRIMETHOPRIM 800; 160 MG/1; MG/1
TABLET ORAL
Qty: 60 | Refills: 1 | Status: ERX

## 2020-12-18 ENCOUNTER — APPOINTMENT (OUTPATIENT)
Dept: URBAN - METROPOLITAN AREA CLINIC 252 | Age: 40
Setting detail: DERMATOLOGY
End: 2020-12-18

## 2020-12-18 VITALS — RESPIRATION RATE: 16 BRPM | WEIGHT: 208 LBS | HEIGHT: 62 IN

## 2020-12-18 DIAGNOSIS — L73.2 HIDRADENITIS SUPPURATIVA: ICD-10-CM

## 2020-12-18 DIAGNOSIS — L20.89 OTHER ATOPIC DERMATITIS: ICD-10-CM

## 2020-12-18 PROCEDURE — 99213 OFFICE O/P EST LOW 20 MIN: CPT | Mod: 25

## 2020-12-18 PROCEDURE — OTHER INTRAMUSCULAR KENALOG: OTHER

## 2020-12-18 PROCEDURE — OTHER COUNSELING: OTHER

## 2020-12-18 PROCEDURE — 96372 THER/PROPH/DIAG INJ SC/IM: CPT

## 2020-12-18 ASSESSMENT — LOCATION ZONE DERM
LOCATION ZONE: HAND
LOCATION ZONE: LEG
LOCATION ZONE: TRUNK

## 2020-12-18 ASSESSMENT — LOCATION SIMPLE DESCRIPTION DERM
LOCATION SIMPLE: RIGHT BUTTOCK
LOCATION SIMPLE: RIGHT THIGH
LOCATION SIMPLE: LEFT THIGH
LOCATION SIMPLE: LEFT HAND
LOCATION SIMPLE: RIGHT HAND
LOCATION SIMPLE: GROIN
LOCATION SIMPLE: ABDOMEN

## 2020-12-18 ASSESSMENT — LOCATION DETAILED DESCRIPTION DERM
LOCATION DETAILED: RIGHT SUPRAPUBIC SKIN
LOCATION DETAILED: LEFT LATERAL ABDOMEN
LOCATION DETAILED: LEFT THENAR EMINENCE
LOCATION DETAILED: RIGHT THENAR EMINENCE
LOCATION DETAILED: LEFT ANTERIOR PROXIMAL THIGH
LOCATION DETAILED: RIGHT ANTERIOR PROXIMAL THIGH
LOCATION DETAILED: RIGHT BUTTOCK

## 2020-12-18 NOTE — PROCEDURE: COUNSELING
Patient Specific Counseling (Will Not Stick From Patient To Patient): Discussed that this injection may also help her flare of hidradenitis.
Detail Level: Simple
Patient Specific Counseling (Will Not Stick From Patient To Patient): Patient has two active lesions today. Recommended intralesional Kenalog injection. Patient declined this option today because spots are painful. Advised that the Kenalog injection intramuscular for the dermatitis may have a affect on her current flare. However if she does not improve significantly regarding her hidradenitis within about one week, or if at any point in time things worsen, return to clinic for intralesional injection. Patient expressed understanding. Recommended continuing the bactrim for 30 days. If ultimately the addition of Bactrim does not sufficiently manage this problem, or if when she starts the Bactrim and continues Humira she flares, would switch to spironolactone 50mg bid. Will give Humira 4 total month to have its effect. If there is NO improvement after 4 months then would discontinue. Discussed possibility of getting into a clinic trial should this open up.

## 2020-12-18 NOTE — HPI: RASH
Is This A New Presentation, Or A Follow-Up?: Rash
Additional History: Hand rash has not improved with the triamcinolone 0.5%.

## 2020-12-18 NOTE — HPI: RASH (HIDRADENITIS SUPPURATIVA)
How Severe Is It?: moderate
Is This A New Presentation, Or A Follow-Up?: Follow Up Hidradenitis Suppurativa
Additional History: Patient recentlymstarted having a hidradenitis suppurativa flare. We sent bactrim but in the meantime so did another provider from another clinic. Patient currently taking bactrim ds starting this morning.  Has used bactrim in past that did not help in the past. Never has intralesional Kenalog in past.  Spironolactone has not been used in the past. She continues to inject Humira weekly. She reports she has been back on Humira for the past 2 months.

## 2020-12-18 NOTE — PROCEDURE: INTRAMUSCULAR KENALOG
Concentration (Mg/Ml) Of Additional Medication: 2.5
Concentration (Mg/Ml): 40.0
Total Volume (Ccs): 2.3
Add Option For Additional Mediation: No
Detail Level: None
Consent: - Recommended and performed intramuscular Kenalog injection today. \\n- Risks of IM Kenalog which include but are not limited to injection site atrophy, insomnia, moodiness/irritability, susceptibility to infection, menstrual bleeding in females, elevated blood sugars, weight gain, osteoporosis, glaucoma, and high blood pressure.\\n- Patient/guardian expressed understanding of the possible adverse effects of IM Kenalog. \\n- Written and verbal consent was obtained prior to injection today. All of the patient's questions and concerns were addressed.\\n- If ever flare is not resolved within 2 weeks, then return to clinic. \\n- Patient expressed understanding.
Kenalog Preparation: kenalog

## 2021-01-07 ENCOUNTER — RX ONLY (RX ONLY)
Age: 41
End: 2021-01-07

## 2021-01-07 RX ORDER — ADALIMUMAB 40MG/0.4ML
40MG KIT SUBCUTANEOUS
Qty: 6 | Refills: 0 | Status: ERX

## 2021-02-03 ENCOUNTER — APPOINTMENT (OUTPATIENT)
Dept: URBAN - METROPOLITAN AREA CLINIC 252 | Age: 41
Setting detail: DERMATOLOGY
End: 2021-02-03

## 2021-02-03 VITALS — WEIGHT: 211 LBS | HEIGHT: 62 IN | RESPIRATION RATE: 14 BRPM

## 2021-02-03 DIAGNOSIS — B35.4 TINEA CORPORIS: ICD-10-CM

## 2021-02-03 DIAGNOSIS — L73.2 HIDRADENITIS SUPPURATIVA: ICD-10-CM

## 2021-02-03 PROCEDURE — 36415 COLL VENOUS BLD VENIPUNCTURE: CPT

## 2021-02-03 PROCEDURE — OTHER ORDER TESTS: OTHER

## 2021-02-03 PROCEDURE — 87220 TISSUE EXAM FOR FUNGI: CPT

## 2021-02-03 PROCEDURE — 99214 OFFICE O/P EST MOD 30 MIN: CPT | Mod: 25

## 2021-02-03 PROCEDURE — OTHER COUNSELING: OTHER

## 2021-02-03 PROCEDURE — OTHER HUMIRA MONITORING: OTHER

## 2021-02-03 PROCEDURE — OTHER KOH PREP: OTHER

## 2021-02-03 PROCEDURE — OTHER PRESCRIPTION: OTHER

## 2021-02-03 PROCEDURE — OTHER VENIPUNCTURE: OTHER

## 2021-02-03 RX ORDER — KETOCONAZOLE 20 MG/G
CREAM TOPICAL BID
Qty: 1 | Refills: 2 | Status: ERX | COMMUNITY
Start: 2021-02-03

## 2021-02-03 ASSESSMENT — LOCATION SIMPLE DESCRIPTION DERM
LOCATION SIMPLE: RIGHT UPPER ARM
LOCATION SIMPLE: LEFT UPPER ARM
LOCATION SIMPLE: LEFT THIGH
LOCATION SIMPLE: RIGHT THIGH
LOCATION SIMPLE: GROIN

## 2021-02-03 ASSESSMENT — LOCATION ZONE DERM
LOCATION ZONE: ARM
LOCATION ZONE: TRUNK
LOCATION ZONE: LEG

## 2021-02-03 ASSESSMENT — LOCATION DETAILED DESCRIPTION DERM
LOCATION DETAILED: RIGHT ANTERIOR PROXIMAL THIGH
LOCATION DETAILED: LEFT ANTERIOR PROXIMAL THIGH
LOCATION DETAILED: RIGHT ANTERIOR DISTAL UPPER ARM
LOCATION DETAILED: RIGHT SUPRAPUBIC SKIN
LOCATION DETAILED: LEFT ANTECUBITAL SKIN
LOCATION DETAILED: LEFT SUPRAPUBIC SKIN

## 2021-02-03 NOTE — PROCEDURE: COUNSELING
Detail Level: Simple
Patient Specific Counseling (Will Not Stick From Patient To Patient): Patient should discontinue Bactrim. If she needs a refill she can call and we will send one over. Reviewed her previous lab results and discussed her lymphocytes have been elevated since starting humira and most recently had become more elevated. Advised that I would like her to at least a temporary Discontinue Humira. If blood work today is within normal limits then she can resume. Otherwise lets plan to recheck in 6 to 8 weeks and we will repeat the bloodwork at that point to confirm that things have gone back down to normal, and if they have not, will refer patient back to her primary care doctor to seek referral to hematology. Patient expressed understanding.

## 2021-02-03 NOTE — HPI: MEDICATION (HUMIRA)
How Severe Is It?: moderate
Is This A New Presentation, Or A Follow-Up?: Follow Up Humira
Additional History: Bactrim was added on to regimen and extended. Last flare was December. intramuscular Kenalog injection was given atblov for hidradenitis suppurativa and dermatitis which helped and the dermatitis flares
(0) independent

## 2021-02-03 NOTE — PROCEDURE: HUMIRA MONITORING
Comments: We will consider sending Humira 80mg pen every other week if labs are within normal limits.
Detail Level: Zone
Add High Risk Medication Management Associated Diagnosis?: Yes

## 2021-03-18 ENCOUNTER — APPOINTMENT (OUTPATIENT)
Dept: URBAN - METROPOLITAN AREA CLINIC 252 | Age: 41
Setting detail: DERMATOLOGY
End: 2021-03-18

## 2021-03-18 VITALS — RESPIRATION RATE: 12 BRPM | HEIGHT: 62 IN | WEIGHT: 212 LBS

## 2021-03-18 DIAGNOSIS — L73.2 HIDRADENITIS SUPPURATIVA: ICD-10-CM

## 2021-03-18 DIAGNOSIS — B35.2 TINEA MANUUM: ICD-10-CM

## 2021-03-18 PROCEDURE — OTHER VENIPUNCTURE: OTHER

## 2021-03-18 PROCEDURE — OTHER COUNSELING: OTHER

## 2021-03-18 PROCEDURE — 99213 OFFICE O/P EST LOW 20 MIN: CPT | Mod: 25

## 2021-03-18 PROCEDURE — 36415 COLL VENOUS BLD VENIPUNCTURE: CPT

## 2021-03-18 PROCEDURE — OTHER ORDER TESTS: OTHER

## 2021-03-18 PROCEDURE — OTHER PRESCRIPTION: OTHER

## 2021-03-18 RX ORDER — KETOCONAZOLE 20 MG/G
CREAM TOPICAL
Qty: 1 | Refills: 3 | Status: ERX

## 2021-03-18 ASSESSMENT — LOCATION DETAILED DESCRIPTION DERM
LOCATION DETAILED: RIGHT SUPRAPUBIC SKIN
LOCATION DETAILED: RIGHT ANTERIOR PROXIMAL THIGH
LOCATION DETAILED: LEFT ANTERIOR DISTAL UPPER ARM
LOCATION DETAILED: LEFT PROXIMAL PALMAR MIDDLE FINGER
LOCATION DETAILED: RIGHT DORSAL INDEX FINGER METACARPOPHALANGEAL JOINT
LOCATION DETAILED: RIGHT DISTAL ULNAR PALMAR RING FINGER
LOCATION DETAILED: LEFT DORSAL RING FINGER METACARPOPHALANGEAL JOINT
LOCATION DETAILED: LEFT ANTERIOR PROXIMAL THIGH

## 2021-03-18 ASSESSMENT — LOCATION ZONE DERM
LOCATION ZONE: FINGER
LOCATION ZONE: TRUNK
LOCATION ZONE: HAND
LOCATION ZONE: ARM
LOCATION ZONE: LEG

## 2021-03-18 ASSESSMENT — LOCATION SIMPLE DESCRIPTION DERM
LOCATION SIMPLE: RIGHT RING FINGER
LOCATION SIMPLE: RIGHT HAND
LOCATION SIMPLE: LEFT MIDDLE FINGER
LOCATION SIMPLE: LEFT HAND
LOCATION SIMPLE: LEFT UPPER ARM
LOCATION SIMPLE: GROIN
LOCATION SIMPLE: LEFT THIGH
LOCATION SIMPLE: RIGHT THIGH

## 2021-03-18 NOTE — PROCEDURE: COUNSELING
Patient Specific Counseling (Will Not Stick From Patient To Patient): Advised patient that if she still has elevated lymphocytes then will refer to pcp. If normalized, can restart humira and followup in 6 months. Patient expressed understanding. Will need to send more refills if we will be continuing, she has 4-6 pens at home right now.
Detail Level: Simple
Detail Level: Detailed

## 2021-03-18 NOTE — HPI: RASH (HIDRADENITIS SUPPURATIVA)
Is This A New Presentation, Or A Follow-Up?: Follow Up Hidradenitis Suppurativa
Additional History: Stopped Humira at last office visit 6weeks ago as a result of elevated lymphocytes. Will redraw labs today. If still elevated will refer back to primary care provider. If back to normal then could consider restarting Humira. Only small flare so far.

## 2021-03-25 ENCOUNTER — RX ONLY (RX ONLY)
Age: 41
End: 2021-03-25

## 2021-03-25 RX ORDER — SULFAMETHOXAZOLE AND TRIMETHOPRIM 800; 160 MG/1; MG/1
TABLET ORAL
Qty: 60 | Refills: 1 | Status: ERX | COMMUNITY
Start: 2021-03-25

## 2021-06-03 ENCOUNTER — OFFICE VISIT - HEALTHEAST (OUTPATIENT)
Dept: SURGERY | Facility: CLINIC | Age: 41
End: 2021-06-03

## 2021-06-03 DIAGNOSIS — E66.01 MORBID OBESITY (H): ICD-10-CM

## 2021-06-03 DIAGNOSIS — M51.369 DDD (DEGENERATIVE DISC DISEASE), LUMBAR: ICD-10-CM

## 2021-06-03 DIAGNOSIS — Z72.0 CURRENT NICOTINE USE: ICD-10-CM

## 2021-06-03 RX ORDER — KETOCONAZOLE 20 MG/G
CREAM TOPICAL
Status: SHIPPED | COMMUNITY
Start: 2021-03-18 | End: 2021-09-10

## 2021-06-03 RX ORDER — VARENICLINE TARTRATE 1 MG/1
TABLET, FILM COATED ORAL
Qty: 60 TABLET | Refills: 2 | Status: SHIPPED | OUTPATIENT
Start: 2021-06-03 | End: 2021-08-20

## 2021-06-03 RX ORDER — ESCITALOPRAM OXALATE 10 MG/1
10 TABLET ORAL DAILY
Status: SHIPPED | COMMUNITY
Start: 2021-05-23 | End: 2021-09-10

## 2021-06-03 RX ORDER — BUPROPION HYDROCHLORIDE 150 MG/1
150 TABLET ORAL DAILY
Status: SHIPPED | COMMUNITY
Start: 2021-05-21 | End: 2021-09-10

## 2021-06-03 ASSESSMENT — MIFFLIN-ST. JEOR: SCORE: 1607.09

## 2021-06-07 ENCOUNTER — AMBULATORY - HEALTHEAST (OUTPATIENT)
Dept: SURGERY | Facility: CLINIC | Age: 41
End: 2021-06-07

## 2021-06-25 NOTE — PROGRESS NOTES
"Esther Nair is 41 y.o.  female who presents for a billable video visit today.    How would you like to obtain your AVS? MyChart.  If dropped from the video visit, the video invitation should be resent by: Text to cell phone: [phone]  Will anyone else be joining your video visit? No      Video Start Time: 2:56 PM    Provider Notes:   New Bariatric Surgery Consultation Note    6/3/2021    RE: Esther Nair  MR#: [id number]  : 1980      Referring provider: No flowsheet data found.    Chief Complaint/Reason for visit: evaluation for possible weight loss surgery    Dear Pastora Quezada, DO,    I had the pleasure of seeing your patient, Esther Nair, to evaluate her obesity and consider her for possible weight loss surgery. As you know, Esther Nair is 41 y.o..  She has a height of Height: 5' 2\" (1.575 m)  , a weight of   Vitals:    21 1400   Weight: 218 lb (98.9 kg)   , and calculated Body mass index is 39.87 kg/m .  She isn't sure about her weight and given that she's slightly under criteria for bariatric surgery at a BMI of 39.9 we'll have her come in for measurements as \"I could be 10-15lbs more than that as I haven't weighed myself recently\". If BMI over 40 she'd qualify for proceeding with surgical weight loss as she lacks known hepatic steatosis, sleep apnea, type II diabetes or hypertension.     She is interested in a sleeve gastrectomy. No major GERD history so we'll defer EGD evaluation to the discretion of her surgeon.    She is a nicotine user, has quit in the past with chantix and was open to Chantix restart to assist likelihood of lifelong avoidance necessary going forward after bariatric surgery. Extensive counseling given today on perils of nicotine following bariatric surgery.  She'll set a quit date and we'll screen her for nicotine 3 months after as well as preop, understanding a relapse would push back everything at least 3 months from relapse date.    Her early morning " schedule offers some hurdles for rest/appetite regulation and can work with dietician on optimizing meal planning around her 5am to 3pm job and 7-8pm bedtime.        Plan:  1. Come in for measurements. BMI over 40 would meet criteria for surgical interventions.  You favor a sleeve gastrectomy which should work well for you.  2. Set a quit date for nicotine. Follow directions on starter pack for chantix and then continue, if tolerated for 6-8 weeks beyond quit date. Nicotine screen at 3 months after quit date and preop. Update us if any relapse and we'll adjust your testing/surgery accordingly.  Lifelong avoidance is mandatory for reducing risk of ulcers/bleeding/complications.  3. Intake with dietician and psychology.  4. Intake labs can be done anytime, I'll message results once all are back.  5. Mammogram needed, you can get this done through your primary clinic.  6. Colonoscopy is up to date, due for a recheck in 2022-23 (polyp hx).   7. Attend support group at least one (see instructions below).  8. Once cleared by psychology/dietician and your 3 month nicotine screen comes back negative you can discuss timing of surgery with your surgeon of choice, Dr. Alfonso or Dr. Vanegas.  Both are excellent.  9. Anticipate the use of Actgall for 6 months following surgery to reduce your risk of gallstones.   10. Continue staying active, recommend at least 150-200minutes/week of moderate aerobic activity and to preserve your lean muscle mass during rapid weight loss have 3 days weekly of 15minutes or more of strength work (body weight/handweights/machines/etc). Find the active hobbies/adventures you enjoy to keep fitness high throughout the year.            HISTORY OF PRESENT ILLNESS:  Weight Loss History Reviewed with Patient 6/3/2021   I have tried the following weight loss medications? (Check all that apply) None   Have you ever had weight loss surgery? No       CO-MORBIDITIES OF OBESITY INCLUDE:  No flowsheet data found.  DDD of lumbar spine, joint pains of the hips. Asthma.    PAST MEDICAL HISTORY:  Past Medical History:   Diagnosis Date     Arthritis     hips.     Asthma     rare wheezing, may be dog related. if flare up uses inhaler.     Depression     no hospitalizations or suicide attempts.     H/O hemorrhoids     rare bleeding.     Hydradenitis        PAST SURGICAL HISTORY:  Past Surgical History:   Procedure Laterality Date     COLONOSCOPY      polyps, due for recheck 2023.     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 Yes   Who do you have in your support network that can be available to help you for the first 2 weeks after surgery? Fiance   Who can you count on for support throughout your weight loss surgery journey? Fiance       HABITS:  BAR SURGERY - HABITS 6/3/2021   How often do you drink alcohol? 2-4 times a month   If you do drink alcohol, how many drinks might you have in a day? (one drink = 5 oz. wine, 1 can/bottle of beer, 1 shot liquor) 3 or 4   Do you currently use any of the following Nicotine products? Cigarettes   Have you or are you currently using street drugs or prescription strength medication for which you do not have a prescription for? No   Do you have a history of chemical dependency (alcohol or drug abuse)? No       PSYCHOLOGICAL HISTORY:   Psychological History Reviewed With Patient 6/3/2021   Have you ever attempted suicide? Never    Have you had thoughts of suicide in the past year? No   Have you ever been hospitalized for mental illness or a suicide attempt? Never   Do you have a history of chronic pain? No   Have you ever been diagnosed with fibromyalgia? No   Are you currently being treated for any of the following? Depression   Are you currently seeing a therapist or counselor?  No   Are you currently seeing a psychiatrist? No       ROS:  BAR NBS ROS 6/3/2021   Skin: None of the above   HEENT: Missing teeth   If you answered yes to missing teeth, please indicate how many: 10   Musculoskeletal: Back pain, Arthritis   Cardiovascular: None of the above   Pulmonary: Shortness of breath at rest, Shortness of breath with activity, Snoring   Gastrointestinal: Constipation   Genitourinary: None of the above   Hematological: None of the above   Neurological: None of the above   Female ONLY: None of the above       EATING BEHAVIORS:  BAR SURGERY - EATING BEHAVIORS 6/3/2021   Have you or anyone else thought that you had an eating disorder? No     , bedtime 7:30pm wakes at 3:30pm, has 35min commute to [place]. 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   Asleep 8am, wakes around 5am on weekends.  EXERCISE:  BAR SURGERY - EXERCISE 6/3/2021   How often do you exercise? 1 to 2 times per week   What is the duration of your exercise (in minutes)? 15 Minutes   What types of exercise do you do? walking       MEDICATIONS:  Current Outpatient Medications   Medication Sig Dispense Refill     buPROPion (WELLBUTRIN XL) 150 MG 24 hr tablet Take 150 mg by mouth daily.       escitalopram oxalate (LEXAPRO) 10 MG tablet Take 10 mg by mouth daily.       ketoconazole (NIZORAL) 2 % cream APPLY THIN LAYER TO THE AFFECTED AREA ON HANDS TWICE DAILY UNTIL RESOLVED       varenicline (CHANTIX STARTING MONTH BOX) 0.5 mg (11)- 1 mg (42) tablet 1 wk before you stop smoking take 0.5mg daily on days 1-3, 0.5mg 2 times each day on days 4-7, then 1mg 2  times daily. 53 tablet 0     varenicline (CHANTIX) 1 mg tablet Take 1 tab by mouth two times a day. Take after eating with a full glass of water. NOTE: Dispense as maintenance for refills only. 60 tablet 2     No current facility-administered medications for this visit.        ALLERGIES:  No Known Allergies    LABS/IMAGING/MEDICAL RECORDS [facility] at 1.55.  Findings:       The terminal ileum appeared normal.       Three polyps were found in the descending colon and transverse colon.        The polyps were 5 to 8 mm in size. These polyps were removed with a        cold snare. Resection and retrieval were complete.       Internal hemorrhoids were found during retroflexion. The hemorrhoids        were small.       The exam was otherwise without abnormality on direct and retroflexion        views.  Moderate Sedation:       This procedure was performed along with another endoscopy.?Please see        moderate sedation documentation from the other endoscopy note.  Impression:          - The examined portion of the ileum was normal.                       - Three 5 to 8 mm polyps in the descending colon and                        in the transverse colon, removed with a cold snare.                        Resected and retrieved.                       - Internal hemorrhoids.                       - The examination was otherwise normal on direct and                        retroflexion views.  Recommendation:      - Await pathology results.                       - High fiber diet, stool softners, Anusol cream OTC                        as needed                       - Repeat colonoscopy in 3 years for surveillance                        based on pathology results.                       - Return to primary care/referring provider for                        ongoing medical care                       - Findings and recommendations were discussed with                        the patient, all questions were  answered    EGD on 2019:  Impression:          - Normal esophagus.                       - Z-line regular.                       - GE junction was patulous but no obvious hiatus                        hernia noted                       - Erythematous mucosa in the gastric body and antrum.                        Biopsied.                       - A single gastric polyp. Resected and retrieved.                       - Normal examined duodenum. Biopsied.  Recommendation:      - Await pathology results.                       - Reflux precautions / PPI                       - Return to primary care/referring provider for                        ongoing medical care                       - Findings and recommendations were discussed with                        the patient, all questions were answered    Pathology on 2019 EGD/colonoscopy in [facility]:   Surgical Pathology                                Case: [id number]                                  Authorizing Provider:  Maxwell Rueda MBBS      Collected:           10/04/2019 10:24 AM          Ordering Location:     [facility]     Received:            10/04/2019 12:17 PM                                 Gastroenterology                                                             Pathologist:           Nataly Dove MD                                                          Specimens:   A) - Colon, transverse, descending                                                                  B) - Duodenum, 2nd                                                                                  C) - Stomach, antrum, and body                                                                      D) - Stomach, gastric body                                                                FINAL DIAGNOSIS A.  Colon, transverse, descending , polypectomies:    Tubular adenomas    B.  Duodenum, 2nd, biopsy:    No diagnostic abnormality    C.  Stomach, antrum and body , biopsy:    No  "diagnostic abnormality    Negative for Helicobacter pylori    D.  Stomach, gastric body , biopsy:    Hyperplastic polyp    Negative for Helicobacter pylori     Gross Description A.  The specimen is received in formalin and labeled with the patient's name and \"Colon, transverse, descending \".  The specimen consists of a 1.2 cm in greatest dimension aggregate of purple to red semi firm polypoid appearing tissue fragments.  The specimen is filtered and entirely submitted      B.  The specimen is received in formalin and labeled with the patient's name and \"Duodenum, 2nd\".  The specimen consists of in one cassette.four  tan-white irregular soft tissue fragments, averaging 0.2 cm.  The specimen is filtered and entirely submitted in one cassette.      C.  The specimen is received in formalin and labeled with the patient's name and \"Stomach, antrum, and body \".  The specimen consists of three tan-white irregular soft tissue fragments, averaging 0.2 cm.  The specimen was collected and placed in formalin at 10:20 AM, 10/4/2019.  The specimen is filtered and entirely submitted in one cassette.      D.  The specimen is received in formalin and labeled with the patient's name and \"Stomach, gastric body \".  The specimen consists of two tan-white irregular soft tissue fragments, averaging 0.2 cm.  The specimen was collected and placed in formalin at 10:20 AM, 10/4/2019.  The specimen is filtered and entirely submitted in one cassette.  JS        The specimen is fixed in formalin for a minimum of 6 hours, and not longer than 72 hours.     Clinical Information Diarrhea  GERD     Microscopic Description Microscopic examination is performed.     Embedded Images       Specimen Collected on   Tissue - Stomach structure (body structure), Tissue specimen (specimen) - Duodenum, Tissue specimen (specimen) - Stomach, antrum, Tissue specimen (specimen) - Stomach structure (body structure) 10/4/2019 10:24 AM       PHYSICAL EXAM:  There were " no vitals filed for this visit.  Normal facies/voice/affect. No cough/dyspnea. No pallor or jaundice evident. Ulitmately, video froze so converted to phone visit.    In summary, Esther Nair has Class III obesity with a body mass index of Body mass index is 39.87 kg/m . kg/m2 and the comorbidities stated above. She completed an informational seminar and is a candidate for the laparoscopic gastric sleeve   Once the patient has completed the requirements in their task list and there are no further recommendations, the pt will be allowed to see the surgeon of her choice for consultation on the laparoscopic gastric sleeve surgery. Patient verbalizes understanding of the process to surgery and expectations for the postoperative period including the need for lifelong lifestyle changes, vitamin supplementation, and laboratory monitoring.  Sincerely,     Shlomo Coreas MD    Medical Decision Makin minutes spent on the date of the encounter doing chart review, history and exam, documentation and further activities per the note        Video-Visit Details    Type of service:  Video Visit was converted to phone visit after 12 minutes due to spotty connection/breaking up.    Video End Time (time video stopped): 4:04 PM  Originating Location (pt. Location): Home    Distant Location (provider location):  [facility] [place]     Platform used for Video Visit: ProxToMe

## 2021-06-26 NOTE — PATIENT INSTRUCTIONS - HE
Plan:  1. Come in for measurements. BMI over 40 would meet criteria for surgical interventions.  You favor a sleeve gastrectomy which should work well for you.  2. Set a quit date for nicotine. Follow directions on starter pack for chantix and then continue, if tolerated for 6-8 weeks beyond quit date. Nicotine screen at 3 months after quit date and preop. Update us if any relapse and we'll adjust your testing/surgery accordingly.  Lifelong avoidance is mandatory for reducing risk of ulcers/bleeding/complications.  3. Intake with dietician and psychology.  4. Intake labs can be done anytime, I'll message results once all are back.  5. Mammogram needed, you can get this done through your primary clinic.  6. Colonoscopy is up to date, due for a recheck in 2022-23 (polyp hx).   7. Attend support group at least one (see instructions below).  8. Once cleared by psychology/dietician and your 3 month nicotine screen comes back negative you can discuss timing of surgery with your surgeon of choice, Dr. Alfonso or Dr. Vanegas.  Both are excellent.  9. Anticipate the use of Actgall for 6 months following surgery to reduce your risk of gallstones.   10. Continue staying active, recommend at least 150-200minutes/week of moderate aerobic activity and to preserve your lean muscle mass during rapid weight loss have 3 days weekly of 15minutes or more of strength work (body weight/handweights/machines/etc). Find the active hobbies/adventures you enjoy to keep fitness high throughout the year.              Before being submitted for insurance approval, you will need the following:    -Clearance by the Psychologist  -Clearance by the dietitian  -Attend Support Group ([address] [place] [address] at [facility]) 2nd Tuesday of the month 6:30-8pm. Make sure to sign in.  -Routine Health Care Maintenance must be up to date (mammograms yearly after age 40, paps as recommended by your primary provider,  colonoscopy after age 50, earlier if high  risk.  -If you are on estrogen, estrogen will be discontinued one month prior to surgery. It may be resumed one month after surgery unless otherwise advised to limit blood clotting risks.  -Pre Operative Lab work-ordered today.    -Structured weight loss visits IF mandated by your insurance carrier or bariatrician.  -Surgeon consult as we get nearer to potential surgery or sooner if you have special considerations that may make your surgery more complex.  -If you have sleep apnea you will need to be using CPAP for at least one month before surgery to reduce your risk of breathing problems after surgery. Make sure to bring your CPAP or BiPAP to the hospital at the time of surgery.    -You will need to be tobacco free for 2 months before surgery and remain a non-smoker thereafter. If you are currently smoking or have recently quit, your urine will be evaluated for tobacco metabolites pre-operatively.  Smoking is a major risk factor for developing ulceration of your surgical sites and potential severe complications.    -If you are on insulin, you might be referred to an endocrinologist who will manage your insulin during the liquid diet and around the time of surgery. This endocrinologist does not replace your primary provider or your endocrinologist.   -You will need an exercise plan which includes MOVE, ie., walking and MUSCLE, ie.,calisthenics, bands, weight, machines, etc...Maintenance of long term weight loss and quality of life is much improved with regular exercise as the weight comes off.  ______________________________________________________________________    Remember that after your bariatric surgery, vitamin supplementation is a lifelong need.    You will take:    B-12 300-500mcg or higher sublingual (under the tongue) daily or by 1000mcg injection 1-2X/month  D3:  3000- 5000 IUs daily   Multivitamin containing 18mg of iron twice a day  Calcium citrate 1 or 2 daily    To keep your weight off and your  vitamin levels up, follow-up is important.    Your labs will be monitored every 6 months for the first two years (every 3 months if you had a duodenal switch) and yearly thereafter.    To avoid ulcers in your stomach avoid tobacco forever, alcohol in excess, caffeine in excess and anti-inflammatories (NSAIDS)  (Aspirin, Ibuprofen, Naproxen and similar medications). Tylenol is fine at usual doses on the box.    If you are told by your physician take Aspirin to protect your heart or for another reason, make sure to take omeprazole or similar medication (protonix, nexium, prevacid) to protect your stomach.    Remember that alcohol affects you differently after bariatric surgery. If you have even ONE drink DO NOT DRIVE due to rapid absorption and fast spiking of blood alcohol levels within minutes of consumption.     Slowly Increasing your exercise capacity such that 3-6 months after your surgery you're tolerating 150-250 minutes of exercise weekly will help optimize your metabolism and improve the chance of good weight loss maintenance.              LEAN PROTEIN SOURCES  Getting 20-30 grams of protein, 3 meals daily, is appropriate for most people, some need more but more than about 40 grams per meal is not useful.  General rule is drinking one ounce of water per gram of protein eaten over the course of the day:  70 grams of protein each day, drink 70 oz of water.  Protein Source Portion Calories Grams of Protein                           Nonfat, plain Greek yogurt    (10 grams sugar or less) 3/4 cup (6 oz)  12-17   Light Yogurt (10 grams sugar or less) 3/4 cup (6 oz)  6-8   Protein Shake 1 shake 110-180 15-30   Skim/1% Milk or lactose-free milk 1 cup ( 8 oz)  8   Plain or light, flavored soymilk 1 cup  7-8   Plain or light, hemp milk 1 cup 110 6   Fat Free or 1% Cottage Cheese 1/2 cup 90 15   Part skim ricotta cheese 1/2 cup 100 14   Part skim or reduced fat cheese slices 1 ounce 65-80 8      Mozzarella String Cheese 1 80 8   Canned tuna, chicken, crab or salmon  (canned in water)  1/2 cup 100 15-20   White fish (broiled, grilled, baked) 3 ounces 100 21   [place]/Tuna (broiled, grilled, baked) 3 ounces 150-180 21   Shrimp, Scallops, Lobster, Crab 3 ounces 100 21   Pork loin, Pork Tenderloin 3 ounces 150 21   Boneless, skinless chicken /turkey breast                          (broiled, grilled, baked) 3 ounces 120 21   [place], [place], [place], and Venison 3 ounces 120 21   Lean cuts of red meat and pork (sirloin,   round, tenderloin, flank, ground 93%-96%) 3 ounces 170 21   Lean or Extra Lean Ground Turkey 1/2 cup 150 20   90-95% Lean [place] Burger 1 rossana 140-180 21   Low-fat casserole with lean meat 3/4 cup 200 17   Luncheon Meats                                                        (turkey, lean ham, roast beef, chicken) 3 ounces 100 21   Egg (boiled, poached, scrambled) 1 Egg 60 7   Egg Substitute 1/2 cup 70 10   Nuts (limit to 1 serving per day)  3 Tbsp. 150 7   Nut [place] (peanut, almond)  Limit to 1 serving or less daily 1 Tbsp. 90 4   Soy Burger (varies) 1  15   Garbanzo, Black, [place] Beans 1/2 cup 110 7   Refried Beans 1/2 cup 100 7   Kidney and Lima beans 1/2 cup 110 7   Tempeh 3 oz 175 18   Vegan crumbles 1/2 cup 100 14   Tofu 1/2 cup 110 14   Chili (beans and extra lean beef or turkey) 1 cup 200 23   Lentil Stew/Soup 1 cup 150 12   Black Bean Soup 1 cup 175 12         Thank you for your interest in Support Group!  We currently have two options for support group but they are in the virtual format only at this time.  Both groups are using Microsoft Teams for their platform and you can access it through the web or an gladis that can be downloaded to a smart phone if you have one.      The Pre- and Post-op Connections Group is on the second Tuesday of the month from 6:30-8pm and is hosted by Seth Haro, PhD.  If you are interested in this group, you will need to email him at  [email or web address] each month and then he will in turn send you the invitation to join.      We also have a Post-op focused Connections Group the 4th Wednesday of the month from 11am-12pm that is mostly geared toward post-operative patients who are past three months post-op.  This group is hosted by Mary Pulliam, NELY, KYE, RIANA and you can email her to join the group at [email or web address] each month and she will send you an invite similar to Seth's group.  If you decide you would like to be a regular attender at this group, we can add you to an automatic invitation list of people.    You can see more information about available support groups that the [facility] offers to our patients as well by following the link:    [email or web address]      Please let us know if you have any questions.     Thank you,   [facility] Team    Exercise Guidance    Nearly everything that bothers us gets better when the proper amount of exercise can be done in the proper amounts.  Getting to that level safely and without injury is the key.  When it comes to weight loss, exercise is especially important in maintaining the weight loss.  Unfortunately, one of the harsh realities is that substantial weight loss slows our metabolism, often anywhere from 5-20%.    Our brain always remembers our heaviest weight and we can return to that if we're not mindful and moving regularly.  Our biology doesn't understand the concept of having too much energy, only not having enough.  As such, when we lose weight, it's thought that the brain interprets this as we're ill or in a famine and dials back our metabolism to limit further weight loss.  This is why exercise is so important in keeping the weight off and is the main reason people have some weight regain from their low weight point after weight loss.  We have to  make up that 10-20% of calories not being burned.Since we can restrict our intake for only so long, exercise becomes very important in our long term healthy weigh maintenance to balance out the occasional indiscretion with our diet.    Generally, for every 5% body weight reduction in a weight loss season, a person needs to add  kilocalories of exercise in their daily routine to keep that weight off for the long term.  This is why it's vital to be starting your fitness regimen during weight loss season, so that routine is well established as you move into your maintenance period.    Additionally, all sorts of good enzymes and genes turn on with exercise and our stress, sleep, mood and bodies feel better when we can get to the point of making ourselves a little sweaty and short of breath 35-50 minutes most days of the week. But we have to start with what we can do first and give ourselves permission to work our way up to this goal.    Who isn't ready for exercise? Well, if you get severe dizziness/palpitations, chest pain or short of breath/faint with even minimal activity like walking across a room or you're having to pause while going up a flight of stairs, then getting your heart and/or lungs fully evaluated prior to starting an exercise regimen is recommended. Everyone else can probably start a program, but everyone may start at a different point:  Some can set a 5-10 minute walking goal and others will be able to ride their bike for an hour.      Start with where you're at and look to add 10% more each week until you're at that 150 minutes or more a week (or 75 minutes/week or more of vigorous exercise). Moderate exercise can be estimated as the pace you can carry on a conversation and vigorous is the pace at which you can get 3-5 words out before having to take a breath.  If you're using heart rate monitoring, Moderate is about 60% of your maximum heart rate and vigorous about 75%. (Max heart rate  "estimated as 220 beats minus your age:  Example: 220-age of 44 =176 Beats per minute (BPM) maximum. 0.6X 176= 105 BPM (moderate), 132 BMP(vigorous)).    If you like to count steps, the 10,000 steps per day does correlate well with weight maintenance but try to make at least 20-25% of those steps at a brisk pace (like you are about to miss your bus).    Finally, if you are pressed for time, it's important to know that some exercise is better than none.  High Intensity Interval training (HIIT) is a good way to get as much out of a short period of working out. If you can't walk, use the stairs, bike or swim; you could use a punching/arm workout regimen for your activity.  The idea with HIIT is to have a 3-6 minute warm up period of low intensity and the 3-6 \"intervals\" where you push the intensity up and then recover and start the next interval. One study showed that 3 intervals of 20 seconds at \"Maximum Effort\" while either biking on a stationary bike or going up stairs and then having 100 seconds recovery time before the next Maximum Effort was equally as beneficial on cardiovascular fitness development as doing 30 minutes of moderately paced walking 3 days weekly over a 6 week period of time.  So intensity matters. You just need to be able to safely do your desired exercise without injury. There are many great HIIT exercises/routines out there. IF you're not doing much exercise currently, I recommend giving your self 2-3 weeks of moderate exercise, 3 days weekly minimum to get your bones/tendons/muscles used to exercise before going for High Intensity workouts.    If you like to use Apps on the phone, the couch to 5k gladis and 7 minute workout apps are nice places to start if you are reasonably healthy.  There are hundreds of other options out there.  Consider viewing FlashSoftube if gentler exercise/movement is desired. Videos on Oswaldo Chi and chair yoga for seniors exist and are free. Check them out and let's get that 3-4 " "days a week routine going.    Let's move!  Shlomo Coreas MD.     High Intensity Interval Training (HIIT):    To feel our best, our bodies need to move. Our mental health, sleep, memory, immune function, stress coping and metabolic health depend on exercise and its benefits for optimizing how our body works best. In the modern world, most do not get nearly enough exercise.  However, it's important to understand that ANYTHING is better than nothing and if you can get started with a routine for fitness, even a little bit has some benefit compared to none.  The more you do, the better (up to 20 hours weekly, beyond that the benefit tails off), but the NIH guidelines for all adults in [place] is to work up to 150-200 minutes of moderate aerobic activity each week to improve our health.  If you're a person that struggles with time pressure/lack of interest then those 2.5-3 hours weekly may be too much to ask.  So, we have to be very efficient in how we exercise to reap the benefits. It turns out that INTENSITY matters. When we use Vigorous rather than Moderate aerobic activity (Vigorous defined by a heart rate of 70-85% of calculated maximum heart rate; max heart rate equals 220 beats minus your age or the level of effort where you could talk 2-4 words before needing to take a breath), the amount of time required to reap the benefits of exercise is cut in half:  75-90 minutes/week.      A recent study showed that a 10 minute interval workout using a 3-4 minute warm up and then 20 second \"maximum effort\" followed by 1 minute, 40 seconds of recovery and then repeated two more times was as effective in improving fitness as a 30 minute brisk walk.  They utilized exercise bikes or stairs for the effort but you could adapt to whatever geography/gym/pool/bike/hill/staircase that you may have as long as you can safely do the effort without injury.   As your fitness improves, intervals of 30 seconds to 2 minutes of increased " effort followed by 1-2 minutes of recovery are options as well. The fitter you become, the harder and more intervals you'll be able to do.  Start with what you CAN do, not what you WANT to do and that will allow your body to adapt/develop fitness down the road to reach your goals.  Give yourself permission to develop a base of fitness the first 3 weeks and then you should be able to ramp up from there nicely and progressively each week.    Jumping right into a High Intensity Interval workout if you've not been having some level of exercise/fitness recently can increase your risk of injury.  To help develop some base fitness here are some options that would give you a 3-4 week base development, assuming you can walk or ride a stationary bike but haven't been doing much the last few months. 3-4 sessions each week of:      Week Warm up Interval: 3-6 repeats [place]   1 3-5 minutes easy walk/spin 20 seconds brisk but doable pace then recover with 90 seconds easy 2-3 minute easy walk/spin   2 3-5 minutes easy walk/spin 25 seconds brisk, 90 seconds recovery 2-3 minute easy walk/spin   3 3-5 minutes easy walk/spin 30 seconds brisk, 90 seconds recovery 2-3 minute easy walk/spin   4 3-5 minutes easy walk/spin 40 seconds brisk, 60 seconds recovery 2-3 minute easy walk/spin     *for base development, keep resistance steady and just  the speed. Once you've completed base phase, feel free to add a little extra resistance to the faster phase to increase vigorousness/heart rate.  During base phase you should be still able to talk comfortable/sing during faster pedaling/walking.     After completing the base phase, start ramping up workouts on the bike/walking. Have one easy pace workout but of longer duration (add 2-3 minutes each week to the time) each week.  One workout weekly should have an interval workout where you push your intensity working up to those 15-30 second maximum efforts and recovering fully and then  "repeating for at least 3-4 intervals.  Cool down/easy pedal at the end for 1-2 minutes.     Consider a spin class if you have the means/access and remember, it's OK to rest if needed. Make the workout yours and don't focus on other people's abilities, getting started will develop your own fitness every week.  Jogging plans such as the Couch to 10k or any aerobic training program make use of similar intervals and can help you reach a fitter and more capable body regardless of where/how you perform the intervals (walking/biking/swimming/elliptical/row machine/erg arm machine, peddler, raking, lawn mowing,etc).  Go for it.      Bariatric Task List  Status:  Is patient a candidate for bariatric surgery?:  patient is a candidate for bariatric surgery -     Cleared to schedule surgeon consult?:  not cleared to schedule surgeon consult -     Status:  surgery evaluation in process -     Surgeon: Any -        Insurance: Insurance:  [facility] -        Patient Info: Initial Weight:  218 lb -     Date of Initial Weight/Height:  6/3/2021 -     Required Weight Loss:  0 -     Surgery Type:  sleeve gastrectomy -        Dietician Visits: Structured weight loss required by insurance?:  no structured weight loss required -     Clearance from dietician to see surgeon?:  Needed -     Dietician Notes:    -        Psychological Evaluation: Psych eval:  Needed -     Other:    -        Lab Work: Hgb A1c:  Needed - with initial labs   Nicotine Testing:  Needed - 3 months after quit date and preop   Other:  Needed - initial labs ordered      PCP: Establish care with PCP:  Completed -        Smoking: Quit tobacco use (3 months smoke free)?:  Needed -     Quit date:    -        Patient Education:  Information Session:  Completed -     Given \"Making your decision\" handout?:  Given \"\"Making your decision\"\" handout? -     Given support group information?:  support group contact information provided -     Attended support group?:  Needed -     Support " plan in place?:  Completed - fiance      Additional Surgery Requirements: Review Coag plan:  Completed - standard risks   Final nicotine screen:  Needed -     Birth control plan:  Completed - hysterectomy   Other Requirements:  no pap needed - hyst, UTD on Colonoscopy - due in 2022/2023, mammogram needed -     Other Requirements:  Actigall recommended for 6 months post-op to reduce chances of gallstones -        Final Tasks:  Before surgery online class:  Needed - 3 weeks prior with RD and RN   After surgery online class:  Needed - 1 week after with RD   Nurse visit for weigh-in and information:  Complete -     Pre-assessment clinic visit with primary care for H&P:  Needed - within 1 month of surgery   Final labs and testing:  Needed - CXR and EKG within 6 months of surgery, Labs within 1 month.  RN will order closer to surgery      Notes:   -

## 2021-07-06 VITALS — WEIGHT: 218 LBS | HEIGHT: 62 IN | BODY MASS INDEX: 40.12 KG/M2

## 2021-07-06 VITALS — BODY MASS INDEX: 40.57 KG/M2 | WEIGHT: 221.8 LBS

## 2021-07-12 ENCOUNTER — VIRTUAL VISIT (OUTPATIENT)
Dept: SURGERY | Facility: CLINIC | Age: 41
End: 2021-07-12
Payer: COMMERCIAL

## 2021-07-12 DIAGNOSIS — E66.01 MORBID OBESITY (H): Primary | ICD-10-CM

## 2021-07-12 NOTE — LETTER
7/12/2021         RE: Esther Nair  [address] [place] [address]        Dear Colleague,    Thank you for referring your patient, Esther Nair, to the [facility] [place]. Please see a copy of my visit note below.    Video-Visit Details    Type of service:  Video Visit    Video Start Time (time video started): 2:00 PM    Video End Time (time video stopped): 2:55 PM    Originating Location (pt. Location): Home    Distant Location (provider location):  Home office     Mode of Communication:  Video Conference via [email or web address]    Physician has received verbal consent for a Video Visit from the patient? Yes    Esther would like to follow through with bariatric surgery for health reasons. She has struggled with her weight for much of her adult life and now is concerned about various comorbidities. She has a history of depression and recently stopped taking Wellbutrin and Lexapro. She is a boredom eater. She used to struggle with alcohol but has not consumed it since 2014. She also has maintained nicotine cessation since being on Chantix. She has good knowledge of surgery and good support. She will follow up and complete psychological testing. Dx: F50.9    Seth Haro Psy.D, LP            Again, thank you for allowing me to participate in the care of your patient.        Sincerely,        Seth Haro Psy.D, LP

## 2021-07-12 NOTE — PROGRESS NOTES
Video-Visit Details    Type of service:  Video Visit    Video Start Time (time video started): 2:00 PM    Video End Time (time video stopped): 2:55 PM    Originating Location (pt. Location): Home    Distant Location (provider location):  Home office     Mode of Communication:  Video Conference via [email or web address]    Physician has received verbal consent for a Video Visit from the patient? Yes    Esther would like to follow through with bariatric surgery for health reasons. She has struggled with her weight for much of her adult life and now is concerned about various comorbidities. She has a history of depression and recently stopped taking Wellbutrin and Lexapro. She is a boredom eater. She used to struggle with alcohol but has not consumed it since 2014. She also has maintained nicotine cessation since being on Chantix. She has good knowledge of surgery and good support. She will follow up and complete psychological testing. Dx: F50.9    Seth Haro Psy.D, LP

## 2021-08-04 ENCOUNTER — TRANSFERRED RECORDS (OUTPATIENT)
Dept: HEALTH INFORMATION MANAGEMENT | Facility: CLINIC | Age: 41
End: 2021-08-04
Payer: COMMERCIAL

## 2021-08-04 ENCOUNTER — VIRTUAL VISIT (OUTPATIENT)
Dept: SURGERY | Facility: CLINIC | Age: 41
End: 2021-08-04
Payer: COMMERCIAL

## 2021-08-04 DIAGNOSIS — E66.01 MORBID OBESITY (H): Primary | ICD-10-CM

## 2021-08-04 NOTE — PROGRESS NOTES
Video-Visit Details    Type of service:  Video Visit    Video Start Time (time video started): 12:55 PM    Video End Time (time video stopped): 1:35 PM    Originating Location (pt. Location): Other work    Distant Location (provider location):  Home office    Mode of Communication:  Video Conference via [email or web address]    Physician has received verbal consent for a Video Visit from the patient? Yes    Kristina has made quality changes in eating and lifestyle, but still needs to be more mindful about her eating. She will follow up with a list of activities and mindful eating strategies. 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

## 2021-08-04 NOTE — LETTER
8/4/2021         RE: Esther Nair  [address] [place] [address]        Dear Colleague,    Thank you for referring your patient, Esther Nair, to the [facility] SURGERY CLINIC AND BARIATRICS CARE [place]. Please see a copy of my visit note below.    Video-Visit Details    Type of service:  Video Visit    Video Start Time (time video started): 12:55 PM    Video End Time (time video stopped): 1:35 PM    Originating Location (pt. Location): Other work    Distant Location (provider location):  Home office    Mode of Communication:  Video Conference via [email or web address]    Physician has received verbal consent for a Video Visit from the patient? Yes    Kristina has made quality changes in eating and lifestyle, but still needs to be more mindful about her eating. She will follow up with a list of activities and mindful eating strategies. Dx: F50.9; E66.01    Seth Haro, PhD            Again, thank you for allowing me to participate in the care of your patient.        Sincerely,        Seth Haro, PhD

## 2021-08-05 ENCOUNTER — VIRTUAL VISIT (OUTPATIENT)
Dept: SURGERY | Facility: CLINIC | Age: 41
End: 2021-08-05
Payer: COMMERCIAL

## 2021-08-05 VITALS — WEIGHT: 221.8 LBS | BODY MASS INDEX: 40.82 KG/M2 | HEIGHT: 62 IN

## 2021-08-05 DIAGNOSIS — E66.01 OBESITY, CLASS III, BMI 40-49.9 (MORBID OBESITY) (H): Primary | ICD-10-CM

## 2021-08-05 DIAGNOSIS — Z71.3 NUTRITIONAL COUNSELING: ICD-10-CM

## 2021-08-05 PROCEDURE — 97803 MED NUTRITION INDIV SUBSEQ: CPT | Mod: GT | Performed by: DIETITIAN, REGISTERED

## 2021-08-05 ASSESSMENT — MIFFLIN-ST. JEOR: SCORE: 1624.33

## 2021-08-05 NOTE — LETTER
"    8/5/2021         RE: Esther Nair  [address] [place] [address]        Dear Colleague,    Thank you for referring your patient, Esther Nair, to the [facility] SURGERY CLINIC AND BARIATRICS CARE [place]. Please see a copy of my visit note below.    Esther Nair is a 41 year old who is being evaluated via a billable video visit.        How would you like to obtain your AVS? MyChart  If the video visit is dropped, the invitation should be resent by: Send to e-mail at: [email or web address]  Will anyone else be joining your video visit? No    Video Start Time: 2:25p      Follow Up Surgical Weight Loss Supervised Diet Evaluation    Assessment:  Pt. is being seen today for a follow up RD nutritional evaluation. Pt. has been unsuccessful with non-surgical weight loss methods and is interested in bariatric surgery. Today we reviewed current eating habits and level of physical activity, and instructed on the changes that are required for successful bariatric outcomes.    Surgery of interest per pt: LSG.    Workflow review:  Support Group: Not completed.  Psychology:In progress.  Lab work:Not completed.  SWL:Yes 3/6  Needs to schedule mammogram      Weight goal: At or below initial.    Anthropometrics:  Pt's weight is stable from initial  BMI: Body mass index is 40.57 kg/m .   Ht Readings from Last 2 Encounters:   07/01/21 1.575 m (5' 2\")   06/03/21 1.575 m (5' 2\")     Wt Readings from Last 2 Encounters:   07/01/21 100.6 kg (221 lb 12.8 oz)   06/07/21 100.6 kg (221 lb 12.8 oz)       Ideal body weight: 50.1 kg (110 lb 7.2 oz)  Adjusted ideal body weight: 70.3 kg (154 lb 15.8 oz)  Estimated RMR ([place]-St Jeor equation):  1627 kcals x 1.2 (sedentary) = 1952 kcals (for weight maintenance)      Medical History:  Patient Active Problem List   Diagnosis     Hidradenitis suppurativa     Vaginal odor      HbA1c:  No results found for: HGBA1C    Progress over past month: has been eating " breakfast     Diet Recall/Time  Breakfast: boiled eggs OR banana  Lunch: struggles to eat a full lunch at work  Dinner: stir waller     Typical Snacks: snack packs- pepperoni, cheese, etc. - will prep    Meal duration: 20-30 minutes.      fluids by 30 minutes before, during meal, and waiting 30 minutes after meal before drinking fluids: Yes    Beverages  Propel 4-5 bottles per day    Exercise  Continues to be pretty active at work- taking dogs for a walk    PES statement:   (NI-1.3) Excessive energy intake related to Food and nutrition related knowledge deficit concerning excessive energy/oral intake as evidenced by Intake of high caloric density foods/beverages (juice, soda, alcohol) at meals and/or snacks; large portions; frequent grazing; estimated intake that exceeds estimated daily energy intake; binge eating patterns; frequent fast food or restaurant intake; and BMI 40.57     Intervention    Nutrition Education:   1. Provided general overview of diet and lifestyle modifications needed to be a deemed a safe candidate for bariatric surgery.     Food/Nutrient Delivery:  2. Educated patient on eating three meals, with cutting out snacking.  3. Bariatric Plate: Patient and I discussed the importance of including a lean protein source (20-30 grams/meal), vegetables (included at lunch and dinner), one serving (15g) of carbohydrate, and limited added fat (1 tb/day) at each meal.   4. Discussed importance of adequate hydration after surgery, with goal of at least 64 oz of fluids/day.  5. Addressed avoiding all carbonated, caffeinated and sweetened drinks to prepare for bariatric surgery.     Nutrition Counselin. Mindful eating techniques: Encouraged slow meal pace, chewing foods to applesauce consistency for 20-30 minutes/meal.   7. Discussed  fluids 30 minutes before, during, and after meal to prevent dumping syndrome and discomfort post bariatric surgery.     Instructions/Goals:     1. Continue  implementing bariatric surgery lifestyle modifications.  2. Eat three meals per day  3. Protein at each meal    Monitor/Evaluation:  Pt. s target weight: no gain from initial visit, pt. verbalized understanding.       Plan for next visit:   Bariatric plate. Give food journal homework.  Educate on dumping syndrome and reading food labels.    Video-Visit Details    Type of service:  Video Visit    Video End Time:2:39 PM    Originating Location (pt. Location): Home    Distant Location (provider location):  [facility] SURGERY CLINIC AND BARIATRICS CARE [place]     Platform used for Video Visit: Kobi CUELLAR RD          Again, thank you for allowing me to participate in the care of your patient.        Sincerely,        YULIA CUELLAR RD

## 2021-08-05 NOTE — PROGRESS NOTES
"Esther Nair is a 41 year old who is being evaluated via a billable video visit.        How would you like to obtain your AVS? MyChart  If the video visit is dropped, the invitation should be resent by: Send to e-mail at: [email or web address]  Will anyone else be joining your video visit? No    Video Start Time: 2:25p      Follow Up Surgical Weight Loss Supervised Diet Evaluation    Assessment:  Pt. is being seen today for a follow up RD nutritional evaluation. Pt. has been unsuccessful with non-surgical weight loss methods and is interested in bariatric surgery. Today we reviewed current eating habits and level of physical activity, and instructed on the changes that are required for successful bariatric outcomes.    Surgery of interest per pt: LSG.    Workflow review:  Support Group: Not completed.  Psychology:In progress.  Lab work:Not completed.  SWL:Yes 3/6  Needs to schedule mammogram      Weight goal: At or below initial.    Anthropometrics:  Pt's weight is stable from initial  BMI: Body mass index is 40.57 kg/m .   Ht Readings from Last 2 Encounters:   07/01/21 1.575 m (5' 2\")   06/03/21 1.575 m (5' 2\")     Wt Readings from Last 2 Encounters:   07/01/21 100.6 kg (221 lb 12.8 oz)   06/07/21 100.6 kg (221 lb 12.8 oz)       Ideal body weight: 50.1 kg (110 lb 7.2 oz)  Adjusted ideal body weight: 70.3 kg (154 lb 15.8 oz)  Estimated RMR ([place]-St Jeor equation):  1627 kcals x 1.2 (sedentary) = 1952 kcals (for weight maintenance)      Medical History:  Patient Active Problem List   Diagnosis     Hidradenitis suppurativa     Vaginal odor      HbA1c:  No results found for: HGBA1C    Progress over past month: has been eating breakfast     Diet Recall/Time  Breakfast: boiled eggs OR banana  Lunch: struggles to eat a full lunch at work  Dinner: stir waller     Typical Snacks: snack packs- pepperoni, cheese, etc. - will prep    Meal duration: 20-30 minutes.      fluids by 30 minutes before, during meal, and " waiting 30 minutes after meal before drinking fluids: Yes    Beverages  Propel 4-5 bottles per day    Exercise  Continues to be pretty active at work- taking dogs for a walk    PES statement:   (NI-1.3) Excessive energy intake related to Food and nutrition related knowledge deficit concerning excessive energy/oral intake as evidenced by Intake of high caloric density foods/beverages (juice, soda, alcohol) at meals and/or snacks; large portions; frequent grazing; estimated intake that exceeds estimated daily energy intake; binge eating patterns; frequent fast food or restaurant intake; and BMI 40.57     Intervention    Nutrition Education:   1. Provided general overview of diet and lifestyle modifications needed to be a deemed a safe candidate for bariatric surgery.     Food/Nutrient Delivery:  2. Educated patient on eating three meals, with cutting out snacking.  3. Bariatric Plate: Patient and I discussed the importance of including a lean protein source (20-30 grams/meal), vegetables (included at lunch and dinner), one serving (15g) of carbohydrate, and limited added fat (1 tb/day) at each meal.   4. Discussed importance of adequate hydration after surgery, with goal of at least 64 oz of fluids/day.  5. Addressed avoiding all carbonated, caffeinated and sweetened drinks to prepare for bariatric surgery.     Nutrition Counselin. Mindful eating techniques: Encouraged slow meal pace, chewing foods to applesauce consistency for 20-30 minutes/meal.   7. Discussed  fluids 30 minutes before, during, and after meal to prevent dumping syndrome and discomfort post bariatric surgery.     Instructions/Goals:     1. Continue implementing bariatric surgery lifestyle modifications.  2. Eat three meals per day  3. Protein at each meal    Monitor/Evaluation:  Pt. s target weight: no gain from initial visit, pt. verbalized understanding.       Plan for next visit:   Bariatric plate. Give food journal  homework.  Educate on dumping syndrome and reading food labels.    Video-Visit Details    Type of service:  Video Visit    Video End Time:2:39 PM    Originating Location (pt. Location): Home    Distant Location (provider location):  [facility] SURGERY CLINIC AND BAR[facility]ATRICS [facility]     Platform used for Video Visit: Kobi CUELLAR RD

## 2021-08-14 ENCOUNTER — HEALTH MAINTENANCE LETTER (OUTPATIENT)
Age: 41
End: 2021-08-14

## 2021-08-20 ENCOUNTER — MYC REFILL (OUTPATIENT)
Dept: SURGERY | Facility: CLINIC | Age: 41
End: 2021-08-20

## 2021-08-20 DIAGNOSIS — Z72.0 CURRENT NICOTINE USE: ICD-10-CM

## 2021-08-22 RX ORDER — VARENICLINE TARTRATE 1 MG/1
1 TABLET, FILM COATED ORAL 2 TIMES DAILY
Qty: 60 TABLET | Refills: 1 | Status: SHIPPED | OUTPATIENT
Start: 2021-08-22 | End: 2021-12-10

## 2021-09-01 ENCOUNTER — VIRTUAL VISIT (OUTPATIENT)
Dept: SURGERY | Facility: CLINIC | Age: 41
End: 2021-09-01
Payer: COMMERCIAL

## 2021-09-01 ENCOUNTER — MYC MEDICAL ADVICE (OUTPATIENT)
Dept: SURGERY | Facility: CLINIC | Age: 41
End: 2021-09-01

## 2021-09-01 ENCOUNTER — TRANSFERRED RECORDS (OUTPATIENT)
Dept: HEALTH INFORMATION MANAGEMENT | Facility: CLINIC | Age: 41
End: 2021-09-01

## 2021-09-01 VITALS — HEIGHT: 62 IN | BODY MASS INDEX: 39.56 KG/M2 | WEIGHT: 215 LBS

## 2021-09-01 DIAGNOSIS — Z71.3 NUTRITIONAL COUNSELING: ICD-10-CM

## 2021-09-01 DIAGNOSIS — E66.812 OBESITY, CLASS II, BMI 35-39.9, ISOLATED (SEE ACTUAL BMI): Primary | ICD-10-CM

## 2021-09-01 DIAGNOSIS — E66.01 MORBID OBESITY (H): Primary | ICD-10-CM

## 2021-09-01 DIAGNOSIS — E21.1 SECONDARY HYPERPARATHYROIDISM, NON-RENAL (H): ICD-10-CM

## 2021-09-01 DIAGNOSIS — E55.9 VITAMIN D DEFICIENCY: Primary | ICD-10-CM

## 2021-09-01 PROCEDURE — 97803 MED NUTRITION INDIV SUBSEQ: CPT | Mod: GT | Performed by: DIETITIAN, REGISTERED

## 2021-09-01 ASSESSMENT — MIFFLIN-ST. JEOR: SCORE: 1593.48

## 2021-09-01 NOTE — LETTER
"    9/1/2021         RE: Esther Nair  [address] [place] [address]        Dear Colleague,    Thank you for referring your patient, Esther Nair, to the [facility] SURGERY CLINIC AND BARIATRICS CARE [place]. Please see a copy of my visit note below.    Esther Nair is a 41 year old who is being evaluated via a billable video visit.        How would you like to obtain your AVS? MyChart  If the video visit is dropped, the invitation should be resent by: Send to e-mail at: [email or web address]  Will anyone else be joining your video visit? No      Video Start Time: 3:30p      Follow Up Surgical Weight Loss Supervised Diet Evaluation    Assessment:  Pt. is being seen today for a follow up RD nutritional evaluation. Pt. has been unsuccessful with non-surgical weight loss methods and is interested in bariatric surgery. Today we reviewed current eating habits and level of physical activity, and instructed on the changes that are required for successful bariatric outcomes.    Surgery of interest per pt: LSG.    Workflow review:  Support Group: Not completed- will do on the 14th  Psychology:follow up with 30 days non-smoking.  Lab work:Not completed.  SWL:Yes 4/6  Still smoking- chantix is recalled- 1/2 pack or less per day- struggles at work (stress)  Mammogram on 14th    Weight goal: At or below initial.    Anthropometrics:  Pt's weight today is 215lb, this weight is down 6.8lb from initial weight of 221.8  BMI: Body mass index is 39.32 kg/m .   Ht Readings from Last 2 Encounters:   08/05/21 1.575 m (5' 2\")   07/01/21 1.575 m (5' 2\")     Wt Readings from Last 2 Encounters:   08/05/21 100.6 kg (221 lb 12.8 oz)   07/01/21 100.6 kg (221 lb 12.8 oz)       Ideal body weight: 50.1 kg (110 lb 7.2 oz)  Adjusted ideal body weight: 70.3 kg (154 lb 15.8 oz)  Estimated RMR ([place]-St Jeor equation):  1627 kcals x 1.2 (sedentary) = 1952 kcals (for weight maintenance)      Medical History:  Patient " Active Problem List   Diagnosis     Hidradenitis suppurativa     Vaginal odor     HbA1c:  No results found for: HGBA1C    Progress over past month: still using salami, pepperoni with cheese packets  +grilled chicken and salad for lunches  +steak and 2 hard boiled eggs for breakfast      Meal duration: 30 minutes.      fluids by 30 minutes before, during meal, and waiting 30 minutes after meal before drinking fluids: Yes    Beverages  64+oz- propel    Exercise  Dog for a walk 2 days per week minimum for the past 2 weeks    PES statement:   (NI-1.3) Excessive energy intake related to Food and nutrition related knowledge deficit concerning excessive energy/oral intake as evidenced by Intake of high caloric density foods/beverages (juice, soda, alcohol) at meals and/or snacks; large portions; frequent grazing; estimated intake that exceeds estimated daily energy intake; binge eating patterns; frequent fast food or restaurant intake; and BMI 39.32     Intervention    Nutrition Education:   1. Provided general overview of diet and lifestyle modifications needed to be a deemed a safe candidate for bariatric surgery.      Food/Nutrient Delivery:  2. Educated patient on eating three meals, with cutting out snacking.  3. Bariatric Plate: Patient and I discussed the importance of including a lean protein source (20-30 grams/meal), vegetables (included at lunch and dinner), one serving (15g) of carbohydrate, and limited added fat (1 tb/day) at each meal.   4. Educated patient on using a protein powder drink as a meal replacement and/or supplement after bariatric surgery.   5. Discussed importance of adequate hydration after surgery, with goal of at least 64 oz of fluids/day.  6. Addressed avoiding all carbonated, caffeinated and sweetened drinks to prepare for bariatric surgery.     Nutrition Counselin. Mindful eating techniques: Encouraged slow meal pace, chewing foods to applesauce consistency for 20-30  minutes/meal.   8. Discussed  fluids 30 minutes before, during, and after meal to prevent dumping syndrome and discomfort post bariatric surgery.     Instructions/Goals:     1. Continue implementing bariatric surgery lifestyle modifications.  2. Try a couple new recipes- protein alternatives  3. Snack on fruits and veggies- cooking and prepping  4. QUIT SMOKING    Handouts Provided:       Monitor/Evaluation:  Pt. s target weight:  no gain from initial visit, pt. verbalized understanding.     Plan for next visit:   Educate on dumping syndrome and reading food labels.    Video-Visit Details    Type of service:  Video Visit    Video End Time:4:00p    Originating Location (pt. Location): Home    Distant Location (provider location):  [facility] SURGERY CLINIC AND BARIATRICS [facility]     Platform used for Video Visit: Kobi CUELLAR RD          Again, thank you for allowing me to participate in the care of your patient.        Sincerely,        YULIA CUELLAR RD

## 2021-09-01 NOTE — PROGRESS NOTES
"Esther Nair is a 41 year old who is being evaluated via a billable video visit.        How would you like to obtain your AVS? MyChart  If the video visit is dropped, the invitation should be resent by: Send to e-mail at: [email or web address]  Will anyone else be joining your video visit? No      Video Start Time: 3:30p      Follow Up Surgical Weight Loss Supervised Diet Evaluation    Assessment:  Pt. is being seen today for a follow up RD nutritional evaluation. Pt. has been unsuccessful with non-surgical weight loss methods and is interested in bariatric surgery. Today we reviewed current eating habits and level of physical activity, and instructed on the changes that are required for successful bariatric outcomes.    Surgery of interest per pt: LSG.    Workflow review:  Support Group: Not completed- will do on the 14th  Psychology:follow up with 30 days non-smoking.  Lab work:Not completed.  SWL:Yes 4/6  Still smoking- chantix is recalled- 1/2 pack or less per day- struggles at work (stress)  Mammogram on 14th    Weight goal: At or below initial.    Anthropometrics:  Pt's weight today is 215lb, this weight is down 6.8lb from initial weight of 221.8  BMI: Body mass index is 39.32 kg/m .   Ht Readings from Last 2 Encounters:   08/05/21 1.575 m (5' 2\")   07/01/21 1.575 m (5' 2\")     Wt Readings from Last 2 Encounters:   08/05/21 100.6 kg (221 lb 12.8 oz)   07/01/21 100.6 kg (221 lb 12.8 oz)       Ideal body weight: 50.1 kg (110 lb 7.2 oz)  Adjusted ideal body weight: 70.3 kg (154 lb 15.8 oz)  Estimated RMR ([place]-St Jeor equation):  1627 kcals x 1.2 (sedentary) = 1952 kcals (for weight maintenance)      Medical History:  Patient Active Problem List   Diagnosis     Hidradenitis suppurativa     Vaginal odor     HbA1c:  No results found for: HGBA1C    Progress over past month: still using salami, pepperoni with cheese packets  +grilled chicken and salad for lunches  +steak and 2 hard boiled eggs for " breakfast      Meal duration: 30 minutes.      fluids by 30 minutes before, during meal, and waiting 30 minutes after meal before drinking fluids: Yes    Beverages  64+oz- propel    Exercise  Dog for a walk 2 days per week minimum for the past 2 weeks    PES statement:   (NI-1.3) Excessive energy intake related to Food and nutrition related knowledge deficit concerning excessive energy/oral intake as evidenced by Intake of high caloric density foods/beverages (juice, soda, alcohol) at meals and/or snacks; large portions; frequent grazing; estimated intake that exceeds estimated daily energy intake; binge eating patterns; frequent fast food or restaurant intake; and BMI 39.32     Intervention    Nutrition Education:   1. Provided general overview of diet and lifestyle modifications needed to be a deemed a safe candidate for bariatric surgery.      Food/Nutrient Delivery:  2. Educated patient on eating three meals, with cutting out snacking.  3. Bariatric Plate: Patient and I discussed the importance of including a lean protein source (20-30 grams/meal), vegetables (included at lunch and dinner), one serving (15g) of carbohydrate, and limited added fat (1 tb/day) at each meal.   4. Educated patient on using a protein powder drink as a meal replacement and/or supplement after bariatric surgery.   5. Discussed importance of adequate hydration after surgery, with goal of at least 64 oz of fluids/day.  6. Addressed avoiding all carbonated, caffeinated and sweetened drinks to prepare for bariatric surgery.     Nutrition Counselin. Mindful eating techniques: Encouraged slow meal pace, chewing foods to applesauce consistency for 20-30 minutes/meal.   8. Discussed  fluids 30 minutes before, during, and after meal to prevent dumping syndrome and discomfort post bariatric surgery.     Instructions/Goals:     1. Continue implementing bariatric surgery lifestyle modifications.  2. Try a couple new  recipes- protein alternatives  3. Snack on fruits and veggies- cooking and prepping  4. QUIT SMOKING    Handouts Provided:       Monitor/Evaluation:  Pt. s target weight:  no gain from initial visit, pt. verbalized understanding.     Plan for next visit:   Educate on dumping syndrome and reading food labels.    Video-Visit Details    Type of service:  Video Visit    Video End Time:4:00p    Originating Location (pt. Location): Home    Distant Location (provider location):  [facility] SURGERY CLINIC AND BARIATRICS CARE [place]     Platform used for Video Visit: Kobi CUELLAR RD

## 2021-09-01 NOTE — LETTER
9/1/2021         RE: Esther Nair  [address] [place] [address]        Dear Colleague,    Thank you for referring your patient, Esther Nair, to the [facility] SURGERY CLINIC AND BARIATRICS CARE [place]. Please see a copy of my visit note below.    Video-Visit Details    Type of service:  Video Visit    Video Start Time (time video started): 7:30 AM    Video End Time (time video stopped): 7:53 AM    Originating Location (pt. Location): Home    Distant Location (provider location):  Home office    Mode of Communication:  Video Conference via [email or web address]    Physician has received verbal consent for a Video Visit from the patient? Yes    Kristina has made good changes in her eating and lifestyle, but after there was a recall on the Chantix, she went back to smoking a 1/2 pack of cigarettes per day. Thus, once her dietitian feels she is ready to proceed and she has maintained smoking cessation for at least a month, that is when I will see her next. Dx: F50.9; E66.01    Seth Haro, PhD            Again, thank you for allowing me to participate in the care of your patient.        Sincerely,        Seth Haro, PhD

## 2021-09-01 NOTE — PROGRESS NOTES
Video-Visit Details    Type of service:  Video Visit    Video Start Time (time video started): 7:30 AM    Video End Time (time video stopped): 7:53 AM    Originating Location (pt. Location): Home    Distant Location (provider location):  Home office    Mode of Communication:  Video Conference via [email or web address]    Physician has received verbal consent for a Video Visit from the patient? Yes    Kristina has made good changes in her eating and lifestyle, but after there was a recall on the Chantix, she went back to smoking a 1/2 pack of cigarettes per day. Thus, once her dietitian feels she is ready to proceed and she has maintained smoking cessation for at least a month, that is when I will see her next. 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

## 2021-09-02 ENCOUNTER — DOCUMENTATION ONLY (OUTPATIENT)
Dept: SURGERY | Facility: CLINIC | Age: 41
End: 2021-09-02

## 2021-09-02 NOTE — PROGRESS NOTES
LORETA Escalantea has NOT been cleared by Seth.  She is still smoking at least a half a pack of cigarettes a day.

## 2021-09-10 ENCOUNTER — MYC MEDICAL ADVICE (OUTPATIENT)
Dept: SURGERY | Facility: CLINIC | Age: 41
End: 2021-09-10

## 2021-09-10 DIAGNOSIS — E66.01 MORBID OBESITY (H): ICD-10-CM

## 2021-09-10 DIAGNOSIS — E66.812 OBESITY, CLASS II, BMI 35-39.9, ISOLATED (SEE ACTUAL BMI): Primary | ICD-10-CM

## 2021-09-10 NOTE — PROGRESS NOTES
Initial orders placed again as pt would like them faxed to [facility] where she is having a mammogram next week so that she can do them at the same time. Faxed to [phone].    Estrellita Fermin RN, CBN  [facility]  P [phone]  F [phone]

## 2021-09-22 NOTE — TELEPHONE ENCOUNTER
Called to review intake bariatric labs that showed low D, high PTH, lowish zinc w/ high copper likely due to hidradenitis issues, low folate, boderline B12. A1c of 6% should improve w/ dietary changes and weight loss as well has her low HDL and elevated triglycerides (meets criteria for metabolic syndrome).     Recommendations:  1. 8 week boost of D3, 1250mcg weekly. Once completed, use 50mcg/day of D3 until surgery and then double to 4000Ius/100mcg/day  once restarting D3 2-3 weeks after surgery.    2. Start calcium citrate 250-500mg/day.    3. B12 1000mcg sublingual once weekly.    4. Start Equate multivitamin with iron chewable once daily to help folate/zinc.

## 2021-10-05 ENCOUNTER — VIRTUAL VISIT (OUTPATIENT)
Dept: SURGERY | Facility: CLINIC | Age: 41
End: 2021-10-05
Payer: COMMERCIAL

## 2021-10-05 VITALS — HEIGHT: 62 IN | BODY MASS INDEX: 39.56 KG/M2 | WEIGHT: 215 LBS

## 2021-10-05 DIAGNOSIS — E66.812 OBESITY, CLASS II, BMI 35-39.9, ISOLATED (SEE ACTUAL BMI): Primary | ICD-10-CM

## 2021-10-05 DIAGNOSIS — Z71.3 NUTRITIONAL COUNSELING: ICD-10-CM

## 2021-10-05 PROCEDURE — 97803 MED NUTRITION INDIV SUBSEQ: CPT | Mod: GT | Performed by: DIETITIAN, REGISTERED

## 2021-10-05 ASSESSMENT — MIFFLIN-ST. JEOR: SCORE: 1593.48

## 2021-10-05 NOTE — LETTER
"    10/5/2021         RE: Esther Nair  [address] [place] [address]        Dear Colleague,    Thank you for referring your patient, Esther Nair, to the [facility] [place]. Please see a copy of my visit note below.    Esther Nair is a 41 year old who is being evaluated via a billable video visit.      How would you like to obtain your AVS? MyChart  If the video visit is dropped, the invitation should be resent by: Send to e-mail at: [email or web address]  Will anyone else be joining your video visit? No      Video Start Time: 7:25a      Follow Up Surgical Weight Loss Supervised Diet Evaluation    Assessment:  Pt. is being seen today for a follow up RD nutritional evaluation. Pt. has been unsuccessful with non-surgical weight loss methods and is interested in bariatric surgery. Today we reviewed current eating habits and level of physical activity, and instructed on the changes that are required for successful bariatric outcomes.    Surgery of interest per pt: LSG.    Workflow review:  Support Group: will do it next week.  Psychology:In progress.  Lab work:Completed.  SWL:Yes 5/6  22 days smoke free    Weight goal: At or below initial.    Anthropometrics:  Pt's weight is no change from previous visit at 215lb- 6.8lb down from initial weight of 221.8lb  BMI: Body mass index is 39.32 kg/m .   Ht Readings from Last 2 Encounters:   09/01/21 1.575 m (5' 2\")   08/05/21 1.575 m (5' 2\")     Wt Readings from Last 2 Encounters:   09/01/21 97.5 kg (215 lb)   08/05/21 100.6 kg (221 lb 12.8 oz)     Ideal body weight: 50.1 kg (110 lb 7.2 oz)  Adjusted ideal body weight: 69.1 kg (152 lb 4.3 oz)  Estimated RMR ([place]-St Jeor equation):  1627 kcals x 1.2 (sedentary) = 1952 kcals (for weight maintenance)      Medical History:  Patient Active Problem List   Diagnosis     Hidradenitis suppurativa     Vaginal odor      HbA1c:  No results found for: " HGBA1C    Progress over past month: started taking bariatric vitamins  +states she is snacking on grapes, celery, etc in order to quit smoking   +niece moved in with her who is a qualified - helping with meal and meal prepping    Diet Recall/Time  Lunch: chicken salad  Dinner: protein pizza    Typical Snacks: grapes, celery      Meal duration: 20 minutes.      fluids by 30 minutes before, during meal, and waiting 30 minutes after meal before drinking fluids: Yes    Beverages  Liquid IV- 2-3 bottles water per day    Exercise  Walking dog- took a break last week- has talked to marielle about going to the gym with her daily    PES statement:   (NI-1.3) Excessive energy intake related to Food and nutrition related knowledge deficit concerning excessive energy/oral intake as evidenced by Intake of high caloric density foods/beverages (juice, soda, alcohol) at meals and/or snacks; large portions; frequent grazing; estimated intake that exceeds estimated daily energy intake; binge eating patterns; frequent fast food or restaurant intake; and BMI 39.32     Intervention    Nutrition Education:   1. Provided general overview of diet and lifestyle modifications needed to be a deemed a safe candidate for bariatric surgery.     Food/Nutrient Delivery:  2. Educated patient on eating three meals, with cutting out snacking.  3. Bariatric Plate: Patient and I discussed the importance of including a lean protein source (20-30 grams/meal), vegetables (included at lunch and dinner), one serving (15g) of carbohydrate, and limited added fat (1 tb/day) at each meal.   4. Discussed importance of adequate hydration after surgery, with goal of at least 64 oz of fluids/day.  5. Addressed avoiding all carbonated, caffeinated and sweetened drinks to prepare for bariatric surgery.     Nutrition Counselin. Mindful eating techniques: Encouraged slow meal pace, chewing foods to applesauce consistency for 20-30 minutes/meal.    7. Discussed  fluids 30 minutes before, during, and after meal to prevent dumping syndrome and discomfort post bariatric surgery.     Instructions/Goals:     1. Continue implementing bariatric surgery lifestyle modifications.  2. Get into the gym  3. Increase water to 4 bottles    Handouts Provided:   Anti-inflammatory diet    Monitor/Evaluation:  Pt. s target weight: no gain from initial visit, pt. verbalized understanding.       Plan for next visit:   (Final supervised diet visit with RD) pre/post-op  diet progression, give review of surgery process.    Video-Visit Details    Type of service:  Video Visit    Video End Time:7:50a    Originating Location (pt. Location): Home    Distant Location (provider location):  [facility] [place]     Platform used for Video Visit: Kobi CUELLAR RD          Again, thank you for allowing me to participate in the care of your patient.        Sincerely,        YULIA CUELLAR RD

## 2021-10-05 NOTE — PROGRESS NOTES
"Esther Nair is a 41 year old who is being evaluated via a billable video visit.      How would you like to obtain your AVS? MyChart  If the video visit is dropped, the invitation should be resent by: Send to e-mail at: [email or web address]  Will anyone else be joining your video visit? No      Video Start Time: 7:25a      Follow Up Surgical Weight Loss Supervised Diet Evaluation    Assessment:  Pt. is being seen today for a follow up RD nutritional evaluation. Pt. has been unsuccessful with non-surgical weight loss methods and is interested in bariatric surgery. Today we reviewed current eating habits and level of physical activity, and instructed on the changes that are required for successful bariatric outcomes.    Surgery of interest per pt: LSG.    Workflow review:  Support Group: will do it next week.  Psychology:In progress.  Lab work:Completed.  SWL:Yes 5/6  22 days smoke free    Weight goal: At or below initial.    Anthropometrics:  Pt's weight is no change from previous visit at 215lb- 6.8lb down from initial weight of 221.8lb  BMI: Body mass index is 39.32 kg/m .   Ht Readings from Last 2 Encounters:   09/01/21 1.575 m (5' 2\")   08/05/21 1.575 m (5' 2\")     Wt Readings from Last 2 Encounters:   09/01/21 97.5 kg (215 lb)   08/05/21 100.6 kg (221 lb 12.8 oz)     Ideal body weight: 50.1 kg (110 lb 7.2 oz)  Adjusted ideal body weight: 69.1 kg (152 lb 4.3 oz)  Estimated RMR ([place]-St Jeor equation):  1627 kcals x 1.2 (sedentary) = 1952 kcals (for weight maintenance)      Medical History:  Patient Active Problem List   Diagnosis     Hidradenitis suppurativa     Vaginal odor      HbA1c:  No results found for: HGBA1C    Progress over past month: started taking bariatric vitamins  +states she is snacking on grapes, celery, etc in order to quit smoking   +niece moved in with her who is a qualified - helping with meal and meal prepping    Diet Recall/Time  Lunch: chicken salad  Dinner: protein " pizza    Typical Snacks: grapes, celery      Meal duration: 20 minutes.      fluids by 30 minutes before, during meal, and waiting 30 minutes after meal before drinking fluids: Yes    Beverages  Liquid IV- 2-3 bottles water per day    Exercise  Walking dog- took a break last week- has talked to marielle about going to the gym with her daily    PES statement:   (NI-1.3) Excessive energy intake related to Food and nutrition related knowledge deficit concerning excessive energy/oral intake as evidenced by Intake of high caloric density foods/beverages (juice, soda, alcohol) at meals and/or snacks; large portions; frequent grazing; estimated intake that exceeds estimated daily energy intake; binge eating patterns; frequent fast food or restaurant intake; and BMI 39.32     Intervention    Nutrition Education:   1. Provided general overview of diet and lifestyle modifications needed to be a deemed a safe candidate for bariatric surgery.     Food/Nutrient Delivery:  2. Educated patient on eating three meals, with cutting out snacking.  3. Bariatric Plate: Patient and I discussed the importance of including a lean protein source (20-30 grams/meal), vegetables (included at lunch and dinner), one serving (15g) of carbohydrate, and limited added fat (1 tb/day) at each meal.   4. Discussed importance of adequate hydration after surgery, with goal of at least 64 oz of fluids/day.  5. Addressed avoiding all carbonated, caffeinated and sweetened drinks to prepare for bariatric surgery.     Nutrition Counselin. Mindful eating techniques: Encouraged slow meal pace, chewing foods to applesauce consistency for 20-30 minutes/meal.   7. Discussed  fluids 30 minutes before, during, and after meal to prevent dumping syndrome and discomfort post bariatric surgery.     Instructions/Goals:     1. Continue implementing bariatric surgery lifestyle modifications.  2. Get into the gym  3. Increase water to 4  bottles    Handouts Provided:   Anti-inflammatory diet    Monitor/Evaluation:  Pt. s target weight: no gain from initial visit, pt. verbalized understanding.       Plan for next visit:   (Final supervised diet visit with RD) pre/post-op  diet progression, give review of surgery process.    Video-Visit Details    Type of service:  Video Visit    Video End Time:7:50a    Originating Location (pt. Location): Home    Distant Location (provider location):  [facility] [place]     Platform used for Video Visit: Kobi CUELLAR RD

## 2021-10-10 ENCOUNTER — HEALTH MAINTENANCE LETTER (OUTPATIENT)
Age: 41
End: 2021-10-10

## 2021-10-20 ENCOUNTER — VIRTUAL VISIT (OUTPATIENT)
Dept: SURGERY | Facility: CLINIC | Age: 41
End: 2021-10-20
Payer: COMMERCIAL

## 2021-10-20 DIAGNOSIS — E66.01 MORBID OBESITY (H): Primary | ICD-10-CM

## 2021-10-20 NOTE — LETTER
10/20/2021         RE: Esther Nair  [address] [place] [address]        Dear Colleague,    Thank you for referring your patient, Esther Nair, to the [facility] [place]. Please see a copy of my visit note below.    Video-Visit Details    Type of service:  Video Visit    Video Start Time (time video started): 7:20 AM    Video End Time (time video stopped): 7:43 AM    Originating Location (pt. Location): Home    Distant Location (provider location):  Home office    Mode of Communication:  Video Conference via [email or web address]    Physician has received verbal consent for a Video Visit from the patient? Yes    Kristina has made quality changes in eating and lifestyle and quit smoking 28 days ago. She may be snacking more than usual, but it appears to be on fruit. She will work on this with the dietitian, but from my standpoint, there are no longer contraindications to bariatric surgery from a psychosocial perspective. A report will be sent to Evelia Flores. Dx: F50.9; E66.01    Seth Haro, PhD            Again, thank you for allowing me to participate in the care of your patient.        Sincerely,        Seth Haro, PhD

## 2021-10-20 NOTE — PROGRESS NOTES
Video-Visit Details    Type of service:  Video Visit    Video Start Time (time video started): 7:20 AM    Video End Time (time video stopped): 7:43 AM    Originating Location (pt. Location): Home    Distant Location (provider location):  Home office    Mode of Communication:  Video Conference via [email or web address]    Physician has received verbal consent for a Video Visit from the patient? Yes    Kristina has made quality changes in eating and lifestyle and quit smoking 28 days ago. She may be snacking more than usual, but it appears to be on fruit. She will work on this with the dietitian, but from my standpoint, there are no longer contraindications to bariatric surgery from a psychosocial perspective. A report will be sent to Evelia Flores. 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

## 2021-11-02 ENCOUNTER — VIRTUAL VISIT (OUTPATIENT)
Dept: SURGERY | Facility: CLINIC | Age: 41
End: 2021-11-02
Payer: COMMERCIAL

## 2021-11-02 VITALS — BODY MASS INDEX: 41.96 KG/M2 | WEIGHT: 228 LBS | HEIGHT: 62 IN

## 2021-11-02 DIAGNOSIS — E66.01 OBESITY, CLASS III, BMI 40-49.9 (MORBID OBESITY) (H): Primary | ICD-10-CM

## 2021-11-02 DIAGNOSIS — Z71.3 NUTRITIONAL COUNSELING: ICD-10-CM

## 2021-11-02 PROCEDURE — 97803 MED NUTRITION INDIV SUBSEQ: CPT | Performed by: DIETITIAN, REGISTERED

## 2021-11-02 ASSESSMENT — MIFFLIN-ST. JEOR: SCORE: 1652.45

## 2021-11-02 NOTE — PROGRESS NOTES
Esther Nair is a 41 year old who is being evaluated via a billable video visit.      How would you like to obtain your AVS? MyChart  If the video visit is dropped, the invitation should be resent by: Send to e-mail at: [email or web address]  Will anyone else be joining your video visit? No      Video Start Time: 7:30a      Follow Up Surgical Weight Loss Supervised Diet Evaluation    Assessment:  Pt. is being seen today for a follow up RD nutritional evaluation. Pt. has been unsuccessful with non-surgical weight loss methods and is interested in bariatric surgery. Today we reviewed current eating habits and level of physical activity, and instructed on the changes that are required for successful bariatric outcomes.    Surgery of interest per pt: LSG.    Workflow review:  Support Group: Completed.  Psychology:Completed.  Lab work:Completed.  SWL:Yes 6/6  48 days smoke free    Weight goal: At or below initial.    Anthropometrics:  Pt's weight is 228 lbs 0 oz  Initial weight: 221.8 lb  Weight change: 6.2lb up   BMI: Body mass index is 41.7 kg/m .   Ideal body weight: 50.1 kg (110 lb 7.2 oz)  Adjusted ideal body weight: 69.1 kg (152 lb 4.3 oz)      Medical History:  Patient Active Problem List   Diagnosis     Hidradenitis suppurativa     Vaginal odor    HbA1c:  No results found for: HGBA1C    Progress over past month: working on keeping consistent with diet changes  + Greek yogurt with mixed nuts  +still working with niece- recommending protein shakes    Diet Recall/Time  Breakfast: 2 eggs- quick and easy  Lunch: Greek yogurt with mixed nuts  Dinner: turkey burgers, chicken fajitas- focusing on doing protein     Typical Snacks: not really snacking too much- this has decreased lately    Meal duration: 30-35 minutes.      fluids by 30 minutes before, during meal, and waiting 30 minutes after meal before drinking fluids: Yes    Beverages  Still doing 3-4 propels per day- no caffeine    Exercise  Active with  work- not walking dog as frequently    PES statement:   (NI-1.3) Excessive energy intake related to Food and nutrition related knowledge deficit concerning excessive energy/oral intake as evidenced by Intake of high caloric density foods/beverages (juice, soda, alcohol) at meals and/or snacks; large portions; frequent grazing; estimated intake that exceeds estimated daily energy intake; binge eating patterns; frequent fast food or restaurant intake; and BMI 41.7     Intervention    Nutrition Education:   1. Provided general overview of diet and lifestyle modifications needed to be a deemed a safe candidate for bariatric surgery.   2. Educated patient on how to read a food label: choosing foods with than 10 grams fat and 10 grams sugar per serving to avoid dumping syndrome.  3. Vitamins: Educated on post-op vitamin regimen including MVI+ 18 mg Fe two times a day, calcium citrate 400-600 mg two times a day, 9160-3945 mcg sublingual B12 daily, 5000 IU vitamin D3 daily.     Food/Nutrient Delivery:  4. Educated patient on eating three meals, with cutting out snacking.  5. Bariatric Plate: Patient and I discussed the importance of including a lean protein source (20-30 grams/meal), vegetables (included at lunch and dinner), one serving (15g) of carbohydrate, and limited added fat (1 tb/day) at each meal.   6. Educated patient on using a protein powder drink as a meal replacement and/or supplement after bariatric surgery.   7. Discussed importance of adequate hydration after surgery, with goal of at least 64 oz of fluids/day.  8. Addressed avoiding all carbonated, caffeinated and sweetened drinks to prepare for bariatric surgery.     Nutrition Counselin. Mindful eating techniques: Encouraged slow meal pace, chewing foods to applesauce consistency for 20-30 minutes/meal.   10. Discussed  fluids 30 minutes before, during, and after meal to prevent dumping syndrome and discomfort post bariatric surgery.    11. Discussed pre/post operative diet progression, post op vitamin regimen, gave review of surgery process.     Instructions/Goals:     1. Continue implementing bariatric surgery lifestyle modifications.    Handouts Provided:   Liquid diet  Vitamin information    Monitor/Evaluation:  Pt. s target weight:  no gain from initial visit, pt. verbalized understanding.     Pt has completed all nutrition requirements and is well-informed of the dietary and physical activity requirements that are necessary for successful bariatric outcomes. This pt is an appropriate candidate for surgery from a nutrition standpoint at this time. The patient understands that surgery is a tool, not a cure, and post operative follow up is essential.     Plan for next visit:   Post op RD visits      Video-Visit Details    Type of service:  Video Visit    Video End Time:8:03 AM    Originating Location (pt. Location): Home    Distant Location (provider location):  [facility] [place]     Platform used for Video Visit: Kobi CUELLAR RD

## 2021-11-02 NOTE — LETTER
11/2/2021         RE: Esther Nair  [address] [place] [address]        Dear Colleague,    Thank you for referring your patient, Esther Nair, to the [facility] [place]. Please see a copy of my visit note below.    sEther Nair is a 41 year old who is being evaluated via a billable video visit.      How would you like to obtain your AVS? MyChart  If the video visit is dropped, the invitation should be resent by: Send to e-mail at: [email or web address]  Will anyone else be joining your video visit? No      Video Start Time: 7:30a      Follow Up Surgical Weight Loss Supervised Diet Evaluation    Assessment:  Pt. is being seen today for a follow up RD nutritional evaluation. Pt. has been unsuccessful with non-surgical weight loss methods and is interested in bariatric surgery. Today we reviewed current eating habits and level of physical activity, and instructed on the changes that are required for successful bariatric outcomes.    Surgery of interest per pt: LSG.    Workflow review:  Support Group: Completed.  Psychology:Completed.  Lab work:Completed.  SWL:Yes 6/6  48 days smoke free    Weight goal: At or below initial.    Anthropometrics:  Pt's weight is 228 lbs 0 oz  Initial weight: 221.8 lb  Weight change: 6.2lb up   BMI: Body mass index is 41.7 kg/m .   Ideal body weight: 50.1 kg (110 lb 7.2 oz)  Adjusted ideal body weight: 69.1 kg (152 lb 4.3 oz)      Medical History:  Patient Active Problem List   Diagnosis     Hidradenitis suppurativa     Vaginal odor    HbA1c:  No results found for: HGBA1C    Progress over past month: working on keeping consistent with diet changes  + Greek yogurt with mixed nuts  +still working with niece- recommending protein shakes    Diet Recall/Time  Breakfast: 2 eggs- quick and easy  Lunch: Greek yogurt with mixed nuts  Dinner: turkey burgers, chicken fajitas- focusing on doing protein     Typical Snacks: not  really snacking too much- this has decreased lately    Meal duration: 30-35 minutes.      fluids by 30 minutes before, during meal, and waiting 30 minutes after meal before drinking fluids: Yes    Beverages  Still doing 3-4 propels per day- no caffeine    Exercise  Active with work- not walking dog as frequently    PES statement:   (NI-1.3) Excessive energy intake related to Food and nutrition related knowledge deficit concerning excessive energy/oral intake as evidenced by Intake of high caloric density foods/beverages (juice, soda, alcohol) at meals and/or snacks; large portions; frequent grazing; estimated intake that exceeds estimated daily energy intake; binge eating patterns; frequent fast food or restaurant intake; and BMI 41.7     Intervention    Nutrition Education:   1. Provided general overview of diet and lifestyle modifications needed to be a deemed a safe candidate for bariatric surgery.   2. Educated patient on how to read a food label: choosing foods with than 10 grams fat and 10 grams sugar per serving to avoid dumping syndrome.  3. Vitamins: Educated on post-op vitamin regimen including MVI+ 18 mg Fe two times a day, calcium citrate 400-600 mg two times a day, 1723-7947 mcg sublingual B12 daily, 5000 IU vitamin D3 daily.     Food/Nutrient Delivery:  4. Educated patient on eating three meals, with cutting out snacking.  5. Bariatric Plate: Patient and I discussed the importance of including a lean protein source (20-30 grams/meal), vegetables (included at lunch and dinner), one serving (15g) of carbohydrate, and limited added fat (1 tb/day) at each meal.   6. Educated patient on using a protein powder drink as a meal replacement and/or supplement after bariatric surgery.   7. Discussed importance of adequate hydration after surgery, with goal of at least 64 oz of fluids/day.  8. Addressed avoiding all carbonated, caffeinated and sweetened drinks to prepare for bariatric surgery.      Nutrition Counselin. Mindful eating techniques: Encouraged slow meal pace, chewing foods to applesauce consistency for 20-30 minutes/meal.   10. Discussed  fluids 30 minutes before, during, and after meal to prevent dumping syndrome and discomfort post bariatric surgery.   11. Discussed pre/post operative diet progression, post op vitamin regimen, gave review of surgery process.     Instructions/Goals:     1. Continue implementing bariatric surgery lifestyle modifications.    Handouts Provided:   Liquid diet  Vitamin information    Monitor/Evaluation:  Pt. s target weight:  no gain from initial visit, pt. verbalized understanding.     Pt has completed all nutrition requirements and is well-informed of the dietary and physical activity requirements that are necessary for successful bariatric outcomes. This pt is an appropriate candidate for surgery from a nutrition standpoint at this time. The patient understands that surgery is a tool, not a cure, and post operative follow up is essential.     Plan for next visit:   Post op RD visits      Video-Visit Details    Type of service:  Video Visit    Video End Time:8:03 AM    Originating Location (pt. Location): Home    Distant Location (provider location):  [facility] [place]     Platform used for Video Visit: Kobi CUELLAR RD          Again, thank you for allowing me to participate in the care of your patient.        Sincerely,        YULIA CUELLAR RD

## 2021-12-10 DIAGNOSIS — Z87.891 PERSONAL HISTORY OF TOBACCO USE, PRESENTING HAZARDS TO HEALTH: Primary | ICD-10-CM

## 2021-12-15 ENCOUNTER — LAB (OUTPATIENT)
Dept: LAB | Facility: CLINIC | Age: 41
End: 2021-12-15
Payer: COMMERCIAL

## 2021-12-15 DIAGNOSIS — Z87.891 PERSONAL HISTORY OF TOBACCO USE, PRESENTING HAZARDS TO HEALTH: ICD-10-CM

## 2021-12-15 PROCEDURE — 99000 SPECIMEN HANDLING OFFICE-LAB: CPT

## 2021-12-15 PROCEDURE — 80307 DRUG TEST PRSMV CHEM ANLYZR: CPT

## 2021-12-16 LAB
Lab: NORMAL
PERFORMING LABORATORY: NORMAL
TEST NAME: NORMAL

## 2021-12-19 LAB — MISCELLANEOUS TEST 1 (ARUP): NORMAL

## 2021-12-22 ENCOUNTER — OFFICE VISIT (OUTPATIENT)
Dept: SURGERY | Facility: CLINIC | Age: 41
End: 2021-12-22
Payer: COMMERCIAL

## 2021-12-22 VITALS — BODY MASS INDEX: 43.16 KG/M2 | SYSTOLIC BLOOD PRESSURE: 130 MMHG | DIASTOLIC BLOOD PRESSURE: 78 MMHG | WEIGHT: 236 LBS

## 2021-12-22 DIAGNOSIS — E66.01 MORBID OBESITY (H): Primary | ICD-10-CM

## 2021-12-22 PROCEDURE — 99204 OFFICE O/P NEW MOD 45 MIN: CPT | Performed by: SURGERY

## 2021-12-22 RX ORDER — GABAPENTIN 100 MG/1
600 CAPSULE ORAL
Status: CANCELLED | OUTPATIENT
Start: 2021-12-22

## 2021-12-22 RX ORDER — CEFAZOLIN SODIUM 2 G/100ML
2 INJECTION, SOLUTION INTRAVENOUS
Status: CANCELLED | OUTPATIENT
Start: 2022-02-28

## 2021-12-22 RX ORDER — ACETAMINOPHEN 325 MG/1
975 TABLET ORAL ONCE
Status: CANCELLED | OUTPATIENT
Start: 2022-02-28 | End: 2021-12-22

## 2021-12-22 RX ORDER — CEFAZOLIN SODIUM 2 G/100ML
2 INJECTION, SOLUTION INTRAVENOUS SEE ADMIN INSTRUCTIONS
Status: CANCELLED | OUTPATIENT
Start: 2022-02-28

## 2021-12-22 RX ORDER — MULTIPLE VITAMINS W/ MINERALS TAB 9MG-400MCG
1 TAB ORAL DAILY
COMMUNITY

## 2021-12-22 RX ORDER — PSEUDOEPHEDRINE HCL 30 MG
TABLET ORAL DAILY
Status: ON HOLD | COMMUNITY
End: 2022-02-28

## 2021-12-22 RX ORDER — CELECOXIB 100 MG/1
400 CAPSULE ORAL
Status: CANCELLED | OUTPATIENT
Start: 2021-12-22

## 2021-12-22 RX ORDER — CHOLECALCIFEROL (VITAMIN D3) 50 MCG
1 TABLET ORAL DAILY
Status: ON HOLD | COMMUNITY
End: 2022-02-28

## 2021-12-22 RX ORDER — ONDANSETRON 2 MG/ML
4 INJECTION INTRAMUSCULAR; INTRAVENOUS
Status: CANCELLED | OUTPATIENT
Start: 2022-02-28

## 2021-12-22 RX ORDER — ALBUTEROL SULFATE 90 UG/1
AEROSOL, METERED RESPIRATORY (INHALATION)
COMMUNITY
Start: 2021-11-04

## 2021-12-22 NOTE — PROGRESS NOTES
I was consulted by Beba Oneal to evaluate this pt for Bariatric Surgery.    HPI: Esther Nair is a 41 year old female here today for consideration of metabolic and bariatric surgery. she has had weight problems since having her first child.  She has 2 children.   she Has tried multiple weight loss programs in the past with poor success.    she carries most of his/her obesity in through their abdomen, and hips.   This pt does not have Hypertention.   This pt does not have GERD.  She has occasional symptoms that she uses antacids for, once or twice a months.   The pt does not have Sleep Apnea.   This pt does not have diabetes.  .    .  .      Allergies:Patient has no known allergies.    Past Medical History:   Diagnosis Date     Arthritis     hips.     Asthma     rare wheezing, may be dog related. if flare up uses inhaler.     Depression     no hospitalizations or suicide attempts.     H/O hemorrhoids     rare bleeding.     Hydradenitis        Past Surgical History:   Procedure Laterality Date     COLONOSCOPY      polyps, due for recheck 2023.     HYSTERECTOMY  2018     SPINE SURGERY       TUBAL LIGATION  2005     UPPER GASTROINTESTINAL ENDOSCOPY         CURRENT MEDS:      Family History   Problem Relation Age of Onset     Bone Cancer Father      Pancreatic Cancer Father      Arthritis Mother      Obesity Mother      Hypertension Brother      Obesity Brother      Melanoma No family hx of         reports that she has never smoked. She has never used smokeless tobacco. She reports current alcohol use. She reports that she does not use drugs.    Review of Systems - 12 point Review of Systems is negative except for the issues mentioned above.    PSYCHIATRIC: she has undergone a lifestyle assessment and has been deemed a good candidate for bariatric surgery by the psychologist.    Vitals: /78   Wt 107 kg (236 lb)   BMI 43.16 kg/m    BMI= Body mass index is 43.16 kg/m .     EXAM:  GENERAL: This is a  well-developed 41 year old female who appears her stated age  HEAD & NECK: Grossly normal.  No palpable thyroid lesions  CARDIAC: RRR without murmur  CHEST/LUNG:  Clear to auscultation  ABDOMEN: Obese.  Nontender.  No hernias or masses appreciated.  LYMPHATIC:  No significant adenopathy appreciated.    EXTREMITIES: Grossly normal.  No evidence of chronic venous stasis.    NEUROLOGIC: Focally intact  INTEGUMENT: No open lesions or ulcers  PSYCHIATRIC: Normal affect. she has a good grasp on the nature of her obesity and the treatment options.    LABS:  No results found for: WBC, HGB, HCT, MCV, PLT  INR/Prothrombin Time  @LABRCNTIP(NA,K,CL,co2,bun,creatinine,labglom,glucose,calcium)@  No results found for: HGBA1C  No results found for: ALT, AST, GGT, ALKPHOS, BILITOT    Assessment/Plan: 41 year old female who is an excellent candidate for bariatric and metabolic surgery.  After a careful conversation with the patient it was decided that a  Laparoscopic Sleeve Gastrectomy. would be her best option. Especially given her history of smoking, though she has been successful in her attempt to quit, I would not recommend a RNY.    She is otherwise in good health and she is interested in the Same Day Surgery Program.    Give her BMI of 41 a one week pre-op diet would be appropriate.      I went over the surgery in detail with her.  I went over the nature of the operation and some of the potential consequences of the surgery.  I went over the expected hospital course and discussed laparoscopic versus open surgery, understanding that we will plan on doing this laparoscopically with the possibility of having to convert to an open operation.  I went over some of the risks and complications of the operation including, but not limited to, DVT, pulmonary emboli, pneumonia, postoperative bleeding, wound infection, staple line leak, intra-abdominal sepsis, and possible death.  I also went over some of the potential nutritional concerns  such as vitamin B-12, iron, vitamin D, vitamin A, calcium and protein deficiencies.  I will also went over the need for lifelong nutritional surveillance.  The patient understands and wants to proceed with surgery.  We will submit for prior authorization.      Jeffery Vanegas MD ,MD  [facility] Department of Surgery  [phone]

## 2021-12-30 ENCOUNTER — DOCUMENTATION ONLY (OUTPATIENT)
Dept: SURGERY | Facility: CLINIC | Age: 41
End: 2021-12-30
Payer: COMMERCIAL

## 2021-12-30 NOTE — PROGRESS NOTES
I have submitted a PA to [facility] for this patient to get approval for a LSG with Dr. Jeffery Vanegas as Out patient at [facility]

## 2022-01-12 ENCOUNTER — DOCUMENTATION ONLY (OUTPATIENT)
Dept: SURGERY | Facility: CLINIC | Age: 42
End: 2022-01-12
Payer: COMMERCIAL

## 2022-01-12 NOTE — PROGRESS NOTES
I have Esther scheduled for a LSG with Dr. Jeffery Vanegas as outpatient at [facility] on 02/07/22 @ 7:30 am.  She is scheduled to see Yun the following morning in clinic at 8:30 am.  She will have her pre op and testing done at [facility].      [facility]# [id number]  12/30/21 - 03/30/2022

## 2022-01-13 RX ORDER — GABAPENTIN 250 MG/5ML
250 SOLUTION ORAL 3 TIMES DAILY
Qty: 45 ML | Refills: 0 | Status: SHIPPED | OUTPATIENT
Start: 2022-02-07

## 2022-01-13 RX ORDER — APREPITANT 40 MG/1
40 CAPSULE ORAL ONCE
Qty: 1 CAPSULE | Refills: 0 | Status: SHIPPED | OUTPATIENT
Start: 2022-02-07 | End: 2022-02-07

## 2022-01-13 RX ORDER — ONDANSETRON 4 MG/1
4 TABLET, ORALLY DISINTEGRATING ORAL EVERY 8 HOURS PRN
Qty: 12 TABLET | Refills: 1 | Status: SHIPPED | OUTPATIENT
Start: 2022-02-07

## 2022-01-13 RX ORDER — GABAPENTIN 250 MG/5ML
250 SOLUTION ORAL 3 TIMES DAILY
Qty: 45 ML | Refills: 0 | Status: CANCELLED | OUTPATIENT
Start: 2022-01-13

## 2022-01-13 RX ORDER — APREPITANT 40 MG/1
40 CAPSULE ORAL ONCE
Qty: 1 CAPSULE | Refills: 0 | Status: CANCELLED | OUTPATIENT
Start: 2022-01-13 | End: 2022-01-13

## 2022-01-13 RX ORDER — URSODIOL 300 MG/1
300 CAPSULE ORAL 2 TIMES DAILY
Qty: 180 CAPSULE | Refills: 1 | Status: ON HOLD | OUTPATIENT
Start: 2022-02-21 | End: 2022-02-28

## 2022-01-13 NOTE — PROGRESS NOTES
Patient attended group class to review pre-op instructions and dietary plan for upcoming surgery.     Discussed admission process and hospital course.  Pharmacy information packet given and explained. Patient was given exercises to work on post-op for maintaining muscle mass and strengthening that was created by [facility].  Bariatric quiz reviewed together to assess understanding.  Discharge instructions and follow up appointments given and reviewed with pt at this time and patient verbalized understanding. She will have her H&P with  [facility] and do her  pre-op testing during that visit. Prescriptions for vitamins, Omeprazole, Actigall, Vitamins, Gabapentin, Tylenol, Zofran, Levsin, and Aprepitant sent to the patient's pharmacy to be started after surgery.      Mary Pulliam RN, CBN

## 2022-01-14 RX ORDER — LANSOPRAZOLE 30 MG/1
30 CAPSULE, DELAYED RELEASE ORAL DAILY
Qty: 90 CAPSULE | Refills: 0 | Status: ON HOLD | OUTPATIENT
Start: 2022-01-14 | End: 2022-02-28

## 2022-01-18 ENCOUNTER — ALLIED HEALTH/NURSE VISIT (OUTPATIENT)
Dept: SURGERY | Facility: CLINIC | Age: 42
End: 2022-01-18
Payer: COMMERCIAL

## 2022-01-18 DIAGNOSIS — G89.18 ACUTE POST-OPERATIVE PAIN: ICD-10-CM

## 2022-01-18 DIAGNOSIS — Z01.818 PRE-OPERATIVE CLEARANCE: ICD-10-CM

## 2022-01-18 DIAGNOSIS — R11.0 NAUSEA: ICD-10-CM

## 2022-01-18 DIAGNOSIS — K21.9 ACID REFLUX: ICD-10-CM

## 2022-01-18 DIAGNOSIS — Z98.84 BARIATRIC SURGERY STATUS: ICD-10-CM

## 2022-01-18 DIAGNOSIS — K22.4 ESOPHAGEAL SPASM: ICD-10-CM

## 2022-01-18 DIAGNOSIS — Z87.891 PERSONAL HISTORY OF TOBACCO USE, PRESENTING HAZARDS TO HEALTH: Primary | ICD-10-CM

## 2022-01-18 PROCEDURE — 99207 PR PREOP VISIT IN GLOBAL PKG: CPT

## 2022-01-18 NOTE — PROGRESS NOTES
Time in: 1:00p /Time out: 2:00p     BMI: There is no height or weight on file to calculate BMI.    Pt presents for nutrition education class for liquid diets. Educated pt on 1-week pre-op and 2-week post-op liquid diets. Discussed appropriate liquids and demonstrated portions for each of the food groupings during each diet phase. Reviewed appropriate calories/protein/fluid goals during 1-week pre-op liquid diet. Educated on correct vitamins/minerals to take after surgery in correct dosage and frequency. Provided grocery list and sample menu plan for each diet stage, as well as unflavored protein powder samples.        Pt will begin 1-week pre-op liquid diet 1-31-22, will do clear liquids the day before surgery. Pt is scheduled for LSG on 2-7-22, and will then follow 2 week post-op liquid diet. Pt will f/u with RD 1-week post-op for further diet advancement.

## 2022-01-19 VITALS — WEIGHT: 224 LBS | BODY MASS INDEX: 40.97 KG/M2

## 2022-01-27 DIAGNOSIS — Z11.59 ENCOUNTER FOR SCREENING FOR OTHER VIRAL DISEASES: Primary | ICD-10-CM

## 2022-02-02 ENCOUNTER — DOCUMENTATION ONLY (OUTPATIENT)
Dept: SURGERY | Facility: CLINIC | Age: 42
End: 2022-02-02
Payer: COMMERCIAL

## 2022-02-02 NOTE — PROGRESS NOTES
Per Dr. Parker, Esther has been moved from 02/07/22 with Dr. Vanegas to 02/28/22.  All appointments have been rescheduled and no COVID Test will be required.  Positive test in patient's chart

## 2022-02-23 ENCOUNTER — TELEPHONE (OUTPATIENT)
Dept: SURGERY | Facility: CLINIC | Age: 42
End: 2022-02-23

## 2022-02-23 DIAGNOSIS — N30.00 ACUTE CYSTITIS WITHOUT HEMATURIA: Primary | ICD-10-CM

## 2022-02-23 RX ORDER — CEPHALEXIN 500 MG/1
500 CAPSULE ORAL 2 TIMES DAILY
Qty: 6 CAPSULE | Refills: 0 | Status: SHIPPED | OUTPATIENT
Start: 2022-02-23 | End: 2022-02-26

## 2022-02-23 NOTE — TELEPHONE ENCOUNTER
12:45 PM  2/23/2022  Left message regarding positive nitrites in preop UA, will Rx 3 days of Keflex 500mg BID for presumptive UTI (her clinic's policy evidently doesn't do a UC for only positive nitrites). No need to delay her surgery next week unless febrile/worsening. Other labs good.  Shlomo Coreas MD

## 2022-02-25 ENCOUNTER — ANESTHESIA EVENT (OUTPATIENT)
Dept: SURGERY | Facility: HOSPITAL | Age: 42
End: 2022-02-25
Payer: COMMERCIAL

## 2022-02-25 RX ORDER — MONTELUKAST SODIUM 10 MG/1
10 TABLET ORAL AT BEDTIME
Status: ON HOLD | COMMUNITY
End: 2022-02-28

## 2022-02-28 ENCOUNTER — ANESTHESIA (OUTPATIENT)
Dept: SURGERY | Facility: HOSPITAL | Age: 42
End: 2022-02-28
Payer: COMMERCIAL

## 2022-02-28 ENCOUNTER — HOSPITAL ENCOUNTER (OUTPATIENT)
Facility: HOSPITAL | Age: 42
Discharge: HOME | End: 2022-02-28
Attending: SURGERY | Admitting: SURGERY
Payer: COMMERCIAL

## 2022-02-28 VITALS
BODY MASS INDEX: 40.79 KG/M2 | HEART RATE: 84 BPM | SYSTOLIC BLOOD PRESSURE: 149 MMHG | TEMPERATURE: 97.9 F | RESPIRATION RATE: 16 BRPM | WEIGHT: 223 LBS | DIASTOLIC BLOOD PRESSURE: 90 MMHG | OXYGEN SATURATION: 96 %

## 2022-02-28 DIAGNOSIS — Z98.84 BARIATRIC SURGERY STATUS: ICD-10-CM

## 2022-02-28 DIAGNOSIS — E66.01 MORBID OBESITY (H): Primary | ICD-10-CM

## 2022-02-28 DIAGNOSIS — Z90.3 S/P GASTRIC SLEEVE PROCEDURE: ICD-10-CM

## 2022-02-28 PROCEDURE — 272N000001 HC OR GENERAL SUPPLY STERILE: Performed by: SURGERY

## 2022-02-28 PROCEDURE — 710N000009 HC RECOVERY PHASE 1, LEVEL 1, PER MIN: Performed by: SURGERY

## 2022-02-28 PROCEDURE — 360N000077 HC SURGERY LEVEL 4, PER MIN: Performed by: SURGERY

## 2022-02-28 PROCEDURE — 258N000003 HC RX IP 258 OP 636: Performed by: NURSE PRACTITIONER

## 2022-02-28 PROCEDURE — 258N000003 HC RX IP 258 OP 636: Performed by: ANESTHESIOLOGY

## 2022-02-28 PROCEDURE — 250N000025 HC SEVOFLURANE, PER MIN: Performed by: SURGERY

## 2022-02-28 PROCEDURE — 250N000011 HC RX IP 250 OP 636: Performed by: SURGERY

## 2022-02-28 PROCEDURE — 250N000011 HC RX IP 250 OP 636: Performed by: NURSE ANESTHETIST, CERTIFIED REGISTERED

## 2022-02-28 PROCEDURE — 250N000013 HC RX MED GY IP 250 OP 250 PS 637: Performed by: SURGERY

## 2022-02-28 PROCEDURE — 250N000011 HC RX IP 250 OP 636: Performed by: ANESTHESIOLOGY

## 2022-02-28 PROCEDURE — 250N000009 HC RX 250: Performed by: SURGERY

## 2022-02-28 PROCEDURE — 43775 LAP SLEEVE GASTRECTOMY: CPT | Performed by: SURGERY

## 2022-02-28 PROCEDURE — 96372 THER/PROPH/DIAG INJ SC/IM: CPT | Performed by: SURGERY

## 2022-02-28 PROCEDURE — 88305 TISSUE EXAM BY PATHOLOGIST: CPT | Mod: TC | Performed by: SURGERY

## 2022-02-28 PROCEDURE — 710N000012 HC RECOVERY PHASE 2, PER MINUTE: Performed by: SURGERY

## 2022-02-28 PROCEDURE — 250N000011 HC RX IP 250 OP 636: Performed by: NURSE PRACTITIONER

## 2022-02-28 PROCEDURE — 370N000017 HC ANESTHESIA TECHNICAL FEE, PER MIN: Performed by: SURGERY

## 2022-02-28 PROCEDURE — 999N000141 HC STATISTIC PRE-PROCEDURE NURSING ASSESSMENT: Performed by: SURGERY

## 2022-02-28 PROCEDURE — 43775 LAP SLEEVE GASTRECTOMY: CPT | Mod: AS | Performed by: NURSE PRACTITIONER

## 2022-02-28 PROCEDURE — 250N000013 HC RX MED GY IP 250 OP 250 PS 637: Performed by: NURSE PRACTITIONER

## 2022-02-28 PROCEDURE — 250N000009 HC RX 250: Performed by: NURSE ANESTHETIST, CERTIFIED REGISTERED

## 2022-02-28 RX ORDER — SODIUM CHLORIDE, SODIUM LACTATE, POTASSIUM CHLORIDE, CALCIUM CHLORIDE 600; 310; 30; 20 MG/100ML; MG/100ML; MG/100ML; MG/100ML
INJECTION, SOLUTION INTRAVENOUS CONTINUOUS
Status: DISCONTINUED | OUTPATIENT
Start: 2022-02-28 | End: 2022-02-28 | Stop reason: HOSPADM

## 2022-02-28 RX ORDER — ONDANSETRON 2 MG/ML
4 INJECTION INTRAMUSCULAR; INTRAVENOUS
Status: DISCONTINUED | OUTPATIENT
Start: 2022-02-28 | End: 2022-02-28 | Stop reason: HOSPADM

## 2022-02-28 RX ORDER — ONDANSETRON 2 MG/ML
4 INJECTION INTRAMUSCULAR; INTRAVENOUS EVERY 30 MIN PRN
Status: DISCONTINUED | OUTPATIENT
Start: 2022-02-28 | End: 2022-02-28 | Stop reason: HOSPADM

## 2022-02-28 RX ORDER — HYDROMORPHONE HYDROCHLORIDE 1 MG/ML
0.5 INJECTION, SOLUTION INTRAMUSCULAR; INTRAVENOUS; SUBCUTANEOUS ONCE
Status: COMPLETED | OUTPATIENT
Start: 2022-02-28 | End: 2022-02-28

## 2022-02-28 RX ORDER — GABAPENTIN 250 MG/5ML
250 SOLUTION ORAL EVERY 8 HOURS SCHEDULED
Status: DISCONTINUED | OUTPATIENT
Start: 2022-02-28 | End: 2022-02-28 | Stop reason: HOSPADM

## 2022-02-28 RX ORDER — NALOXONE HYDROCHLORIDE 1 MG/ML
0.4 INJECTION INTRAMUSCULAR; INTRAVENOUS; SUBCUTANEOUS
Status: DISCONTINUED | OUTPATIENT
Start: 2022-02-28 | End: 2022-02-28 | Stop reason: HOSPADM

## 2022-02-28 RX ORDER — BUPIVACAINE HYDROCHLORIDE 2.5 MG/ML
INJECTION, SOLUTION EPIDURAL; INFILTRATION; INTRACAUDAL PRN
Status: DISCONTINUED | OUTPATIENT
Start: 2022-02-28 | End: 2022-02-28 | Stop reason: HOSPADM

## 2022-02-28 RX ORDER — LIDOCAINE 40 MG/G
CREAM TOPICAL
Status: DISCONTINUED | OUTPATIENT
Start: 2022-02-28 | End: 2022-02-28 | Stop reason: HOSPADM

## 2022-02-28 RX ORDER — KETAMINE HYDROCHLORIDE 50 MG/ML
INJECTION, SOLUTION INTRAMUSCULAR; INTRAVENOUS PRN
Status: DISCONTINUED | OUTPATIENT
Start: 2022-02-28 | End: 2022-02-28

## 2022-02-28 RX ORDER — FENTANYL CITRATE 50 UG/ML
50 INJECTION, SOLUTION INTRAMUSCULAR; INTRAVENOUS
Status: DISCONTINUED | OUTPATIENT
Start: 2022-02-28 | End: 2022-02-28 | Stop reason: HOSPADM

## 2022-02-28 RX ORDER — DEXAMETHASONE SODIUM PHOSPHATE 10 MG/ML
INJECTION, SOLUTION INTRAMUSCULAR; INTRAVENOUS PRN
Status: DISCONTINUED | OUTPATIENT
Start: 2022-02-28 | End: 2022-02-28

## 2022-02-28 RX ORDER — PROPOFOL 10 MG/ML
INJECTION, EMULSION INTRAVENOUS PRN
Status: DISCONTINUED | OUTPATIENT
Start: 2022-02-28 | End: 2022-02-28

## 2022-02-28 RX ORDER — ACETAMINOPHEN 325 MG/1
975 TABLET ORAL EVERY 6 HOURS
Status: DISCONTINUED | OUTPATIENT
Start: 2022-02-28 | End: 2022-02-28 | Stop reason: HOSPADM

## 2022-02-28 RX ORDER — CELECOXIB 200 MG/1
400 CAPSULE ORAL
Status: COMPLETED | OUTPATIENT
Start: 2022-02-28 | End: 2022-02-28

## 2022-02-28 RX ORDER — ONDANSETRON 4 MG/1
4 TABLET, ORALLY DISINTEGRATING ORAL EVERY 30 MIN PRN
Status: DISCONTINUED | OUTPATIENT
Start: 2022-02-28 | End: 2022-02-28 | Stop reason: HOSPADM

## 2022-02-28 RX ORDER — HALOPERIDOL 5 MG/ML
1 INJECTION INTRAMUSCULAR
Status: DISCONTINUED | OUTPATIENT
Start: 2022-02-28 | End: 2022-02-28 | Stop reason: HOSPADM

## 2022-02-28 RX ORDER — TRAMADOL HYDROCHLORIDE 50 MG/1
100 TABLET ORAL EVERY 6 HOURS PRN
Status: DISCONTINUED | OUTPATIENT
Start: 2022-02-28 | End: 2022-02-28 | Stop reason: HOSPADM

## 2022-02-28 RX ORDER — PSEUDOEPHEDRINE HCL 30 MG
TABLET ORAL DAILY
COMMUNITY
Start: 2022-02-28

## 2022-02-28 RX ORDER — OXYCODONE HYDROCHLORIDE 5 MG/1
5 TABLET ORAL EVERY 4 HOURS PRN
Status: DISCONTINUED | OUTPATIENT
Start: 2022-02-28 | End: 2022-02-28 | Stop reason: HOSPADM

## 2022-02-28 RX ORDER — GABAPENTIN 300 MG/1
600 CAPSULE ORAL
Status: COMPLETED | OUTPATIENT
Start: 2022-02-28 | End: 2022-02-28

## 2022-02-28 RX ORDER — PROCHLORPERAZINE MALEATE 10 MG
10 TABLET ORAL EVERY 6 HOURS PRN
Status: DISCONTINUED | OUTPATIENT
Start: 2022-02-28 | End: 2022-02-28 | Stop reason: HOSPADM

## 2022-02-28 RX ORDER — FENTANYL CITRATE 50 UG/ML
25 INJECTION, SOLUTION INTRAMUSCULAR; INTRAVENOUS
Status: DISCONTINUED | OUTPATIENT
Start: 2022-02-28 | End: 2022-02-28 | Stop reason: HOSPADM

## 2022-02-28 RX ORDER — BUPIVACAINE HYDROCHLORIDE 2.5 MG/ML
INJECTION, SOLUTION EPIDURAL; INFILTRATION; INTRACAUDAL
Status: COMPLETED | OUTPATIENT
Start: 2022-02-28 | End: 2022-02-28

## 2022-02-28 RX ORDER — ONDANSETRON 4 MG/1
4 TABLET, ORALLY DISINTEGRATING ORAL EVERY 6 HOURS PRN
Status: DISCONTINUED | OUTPATIENT
Start: 2022-02-28 | End: 2022-02-28 | Stop reason: HOSPADM

## 2022-02-28 RX ORDER — LORAZEPAM 2 MG/ML
.5-1 INJECTION INTRAMUSCULAR EVERY 6 HOURS PRN
Status: DISCONTINUED | OUTPATIENT
Start: 2022-02-28 | End: 2022-02-28 | Stop reason: HOSPADM

## 2022-02-28 RX ORDER — LIDOCAINE HYDROCHLORIDE 20 MG/ML
INJECTION, SOLUTION INFILTRATION; PERINEURAL PRN
Status: DISCONTINUED | OUTPATIENT
Start: 2022-02-28 | End: 2022-02-28

## 2022-02-28 RX ORDER — ONDANSETRON 2 MG/ML
INJECTION INTRAMUSCULAR; INTRAVENOUS PRN
Status: DISCONTINUED | OUTPATIENT
Start: 2022-02-28 | End: 2022-02-28

## 2022-02-28 RX ORDER — HYDROMORPHONE HYDROCHLORIDE 1 MG/ML
0.2 INJECTION, SOLUTION INTRAMUSCULAR; INTRAVENOUS; SUBCUTANEOUS EVERY 5 MIN PRN
Status: DISCONTINUED | OUTPATIENT
Start: 2022-02-28 | End: 2022-02-28 | Stop reason: HOSPADM

## 2022-02-28 RX ORDER — NALOXONE HYDROCHLORIDE 1 MG/ML
0.2 INJECTION INTRAMUSCULAR; INTRAVENOUS; SUBCUTANEOUS
Status: DISCONTINUED | OUTPATIENT
Start: 2022-02-28 | End: 2022-02-28 | Stop reason: HOSPADM

## 2022-02-28 RX ORDER — ACETAMINOPHEN 325 MG/1
975 TABLET ORAL ONCE
Status: COMPLETED | OUTPATIENT
Start: 2022-02-28 | End: 2022-02-28

## 2022-02-28 RX ORDER — FENTANYL CITRATE 50 UG/ML
INJECTION, SOLUTION INTRAMUSCULAR; INTRAVENOUS PRN
Status: DISCONTINUED | OUTPATIENT
Start: 2022-02-28 | End: 2022-02-28

## 2022-02-28 RX ORDER — LABETALOL HYDROCHLORIDE 5 MG/ML
5 INJECTION, SOLUTION INTRAVENOUS EVERY 10 MIN PRN
Status: DISCONTINUED | OUTPATIENT
Start: 2022-02-28 | End: 2022-02-28 | Stop reason: HOSPADM

## 2022-02-28 RX ORDER — KETOROLAC TROMETHAMINE 30 MG/ML
30 INJECTION, SOLUTION INTRAMUSCULAR; INTRAVENOUS EVERY 6 HOURS
Status: DISCONTINUED | OUTPATIENT
Start: 2022-02-28 | End: 2022-02-28 | Stop reason: HOSPADM

## 2022-02-28 RX ORDER — MAGNESIUM SULFATE 4 G/50ML
4 INJECTION INTRAVENOUS ONCE
Status: COMPLETED | OUTPATIENT
Start: 2022-02-28 | End: 2022-02-28

## 2022-02-28 RX ORDER — CHOLECALCIFEROL (VITAMIN D3) 50 MCG
1 TABLET ORAL DAILY
COMMUNITY
Start: 2022-02-28

## 2022-02-28 RX ORDER — URSODIOL 300 MG/1
300 CAPSULE ORAL 2 TIMES DAILY
Qty: 180 CAPSULE | Refills: 1 | COMMUNITY
Start: 2022-02-28

## 2022-02-28 RX ORDER — ONDANSETRON 2 MG/ML
4 INJECTION INTRAMUSCULAR; INTRAVENOUS EVERY 6 HOURS PRN
Status: DISCONTINUED | OUTPATIENT
Start: 2022-02-28 | End: 2022-02-28 | Stop reason: HOSPADM

## 2022-02-28 RX ORDER — MEPERIDINE HYDROCHLORIDE 25 MG/ML
12.5 INJECTION INTRAMUSCULAR; INTRAVENOUS; SUBCUTANEOUS
Status: DISCONTINUED | OUTPATIENT
Start: 2022-02-28 | End: 2022-02-28 | Stop reason: HOSPADM

## 2022-02-28 RX ORDER — CEFAZOLIN SODIUM 2 G/100ML
2 INJECTION, SOLUTION INTRAVENOUS
Status: COMPLETED | OUTPATIENT
Start: 2022-02-28 | End: 2022-02-28

## 2022-02-28 RX ORDER — LABETALOL 20 MG/4 ML (5 MG/ML) INTRAVENOUS SYRINGE
PRN
Status: DISCONTINUED | OUTPATIENT
Start: 2022-02-28 | End: 2022-02-28

## 2022-02-28 RX ORDER — ACETAMINOPHEN 325 MG/1
975 TABLET ORAL ONCE
Status: DISCONTINUED | OUTPATIENT
Start: 2022-02-28 | End: 2022-02-28 | Stop reason: HOSPADM

## 2022-02-28 RX ORDER — PROPOFOL 10 MG/ML
INJECTION, EMULSION INTRAVENOUS CONTINUOUS PRN
Status: DISCONTINUED | OUTPATIENT
Start: 2022-02-28 | End: 2022-02-28

## 2022-02-28 RX ORDER — ENALAPRILAT 1.25 MG/ML
1.25 INJECTION INTRAVENOUS EVERY 6 HOURS PRN
Status: DISCONTINUED | OUTPATIENT
Start: 2022-02-28 | End: 2022-02-28 | Stop reason: HOSPADM

## 2022-02-28 RX ORDER — CEFAZOLIN SODIUM 2 G/100ML
2 INJECTION, SOLUTION INTRAVENOUS SEE ADMIN INSTRUCTIONS
Status: DISCONTINUED | OUTPATIENT
Start: 2022-02-28 | End: 2022-02-28 | Stop reason: HOSPADM

## 2022-02-28 RX ORDER — FENTANYL CITRATE 50 UG/ML
25 INJECTION, SOLUTION INTRAMUSCULAR; INTRAVENOUS EVERY 5 MIN PRN
Status: DISCONTINUED | OUTPATIENT
Start: 2022-02-28 | End: 2022-02-28 | Stop reason: HOSPADM

## 2022-02-28 RX ADMIN — CELECOXIB 400 MG: 200 CAPSULE ORAL at 08:13

## 2022-02-28 RX ADMIN — SODIUM CHLORIDE, POTASSIUM CHLORIDE, SODIUM LACTATE AND CALCIUM CHLORIDE: 600; 310; 30; 20 INJECTION, SOLUTION INTRAVENOUS at 10:30

## 2022-02-28 RX ADMIN — BUPIVACAINE HYDROCHLORIDE 20 ML: 2.5 INJECTION, SOLUTION EPIDURAL; INFILTRATION; INTRACAUDAL at 08:31

## 2022-02-28 RX ADMIN — SODIUM CHLORIDE, POTASSIUM CHLORIDE, SODIUM LACTATE AND CALCIUM CHLORIDE: 600; 310; 30; 20 INJECTION, SOLUTION INTRAVENOUS at 08:28

## 2022-02-28 RX ADMIN — PROPOFOL 200 MG: 10 INJECTION, EMULSION INTRAVENOUS at 09:01

## 2022-02-28 RX ADMIN — PROPOFOL 40 MCG/KG/MIN: 10 INJECTION, EMULSION INTRAVENOUS at 09:05

## 2022-02-28 RX ADMIN — ROCURONIUM BROMIDE 30 MG: 50 INJECTION, SOLUTION INTRAVENOUS at 09:15

## 2022-02-28 RX ADMIN — GABAPENTIN 250 MG: 250 SUSPENSION ORAL at 14:30

## 2022-02-28 RX ADMIN — GABAPENTIN 600 MG: 300 CAPSULE ORAL at 08:14

## 2022-02-28 RX ADMIN — FENTANYL CITRATE 100 MCG: 50 INJECTION INTRAMUSCULAR; INTRAVENOUS at 08:19

## 2022-02-28 RX ADMIN — HYDROMORPHONE HYDROCHLORIDE 0.5 MG: 1 INJECTION, SOLUTION INTRAMUSCULAR; INTRAVENOUS; SUBCUTANEOUS at 13:02

## 2022-02-28 RX ADMIN — KETAMINE HYDROCHLORIDE 30 MG: 50 INJECTION, SOLUTION INTRAMUSCULAR; INTRAVENOUS at 09:01

## 2022-02-28 RX ADMIN — FAMOTIDINE 20 MG: 10 INJECTION, SOLUTION INTRAVENOUS at 08:28

## 2022-02-28 RX ADMIN — ACETAMINOPHEN 975 MG: 325 TABLET ORAL at 08:13

## 2022-02-28 RX ADMIN — FENTANYL CITRATE 100 MCG: 50 INJECTION, SOLUTION INTRAMUSCULAR; INTRAVENOUS at 09:28

## 2022-02-28 RX ADMIN — ACETAMINOPHEN 975 MG: 325 SOLUTION ORAL at 14:32

## 2022-02-28 RX ADMIN — ONDANSETRON 4 MG: 2 INJECTION INTRAMUSCULAR; INTRAVENOUS at 10:03

## 2022-02-28 RX ADMIN — KETAMINE HYDROCHLORIDE 20 MG: 50 INJECTION, SOLUTION INTRAMUSCULAR; INTRAVENOUS at 09:26

## 2022-02-28 RX ADMIN — HYOSCYAMINE SULFATE 125 MCG: 0.12 TABLET ORAL at 13:57

## 2022-02-28 RX ADMIN — DEXAMETHASONE SODIUM PHOSPHATE 10 MG: 10 INJECTION, SOLUTION INTRAMUSCULAR; INTRAVENOUS at 09:01

## 2022-02-28 RX ADMIN — ROCURONIUM BROMIDE 50 MG: 50 INJECTION, SOLUTION INTRAVENOUS at 09:01

## 2022-02-28 RX ADMIN — LABETALOL HYDROCHLORIDE 10 MG: 5 INJECTION, SOLUTION INTRAVENOUS at 10:07

## 2022-02-28 RX ADMIN — MIDAZOLAM 2 MG: 1 INJECTION INTRAMUSCULAR; INTRAVENOUS at 08:55

## 2022-02-28 RX ADMIN — FENTANYL CITRATE 25 MCG: 50 INJECTION, SOLUTION INTRAMUSCULAR; INTRAVENOUS at 11:16

## 2022-02-28 RX ADMIN — LIDOCAINE HYDROCHLORIDE 60 MG: 20 INJECTION, SOLUTION INFILTRATION; PERINEURAL at 09:01

## 2022-02-28 RX ADMIN — ENOXAPARIN SODIUM 40 MG: 40 INJECTION SUBCUTANEOUS at 08:52

## 2022-02-28 RX ADMIN — MIDAZOLAM 2 MG: 1 INJECTION INTRAMUSCULAR; INTRAVENOUS at 08:20

## 2022-02-28 RX ADMIN — FENTANYL CITRATE 25 MCG: 50 INJECTION, SOLUTION INTRAMUSCULAR; INTRAVENOUS at 10:42

## 2022-02-28 RX ADMIN — CEFAZOLIN SODIUM 2 G: 2 INJECTION, SOLUTION INTRAVENOUS at 09:06

## 2022-02-28 RX ADMIN — LABETALOL HYDROCHLORIDE 5 MG: 5 INJECTION, SOLUTION INTRAVENOUS at 11:19

## 2022-02-28 RX ADMIN — FENTANYL CITRATE 25 MCG: 50 INJECTION, SOLUTION INTRAMUSCULAR; INTRAVENOUS at 11:08

## 2022-02-28 RX ADMIN — SUGAMMADEX 200 MG: 100 INJECTION, SOLUTION INTRAVENOUS at 10:04

## 2022-02-28 RX ADMIN — MAGNESIUM SULFATE HEPTAHYDRATE 4 G: 80 INJECTION, SOLUTION INTRAVENOUS at 08:28

## 2022-02-28 RX ADMIN — FENTANYL CITRATE 100 MCG: 50 INJECTION, SOLUTION INTRAMUSCULAR; INTRAVENOUS at 09:01

## 2022-02-28 ASSESSMENT — LIFESTYLE VARIABLES: TOBACCO_USE: 1

## 2022-02-28 NOTE — OP NOTE
LAPAROSCOPIC SLEEVE GASTRECTOMY    Name:  Esther Nair  PCP:  Beba Oneal  Procedure Date:  2/28/2022    Operative Note   Pre-operative diagnosis: Morbid obesity (H) [E66.01]  Post-operative diagnosis: Morbid Obesity  Procedure: Procedure(s):  GASTRECTOMY, SLEEVE, LAPAROSCOPIC  Surgeon: Surgeon(s) and Role:     * Jeffery Vanegas MD - Primary  Surgical Assistant: Elena Alatorre CNP;  her assistance was required for exposure and visualization  Anesthesia: General GET, Intrathecal Duramorph, Tap block placed laparoscopically  Drains: None  Specimens: Resected part of the stomach, the Greater Curve.  Findings: Mobidly Obese, Otherwise normal epigastric anatomy.  Complications: None  Implants: None   BOUGIE SIZE: 40 FR  DISTANCE FROM PYLORUS:  7 CM  STAPLE LINE REINFORCEMENT: Seamgaurd  STAPLE LINE OVERSEW: All vessels that extent to the staple line are addressed with an extra-large clip.  COMORBIDITIES:   Past Medical History:   Diagnosis Date     Arthritis     hips.     Asthma     rare wheezing, may be dog related. if flare up uses inhaler.     Depression     no hospitalizations or suicide attempts.     Depression      Gastroesophageal reflux disease      H/O hemorrhoids     rare bleeding.     Hidradenitis suppurativa      Hydradenitis      Morbid obesity (H)       INDICATIONS FOR PROCEDURE  Esther Nair is a 41 year old female who is morbidly obese. Numerous weight loss attempts without surgery have been without success.   After understanding the risks and benefits of proceeding with a laparoscopic vertical sleeve gastrectomy, she agreed to an operation as outlined by me.     The BMI that we are treating this patient for was measured at the initial consultation visit in our bariatric program and it was: 42 kg/m2 (as calculated just below).      Our weight loss surgery program requires weight loss prior to bariatric surgery and currently the height,  weight, and BMI are as follows:    , Weight: 101.2 kg (223 lb), and currently the Body mass index is 40.79 kg/m .   Due to the patient's comorbidity conditions of GERD, and Hidradenitis, in association with elevated body mass index, bariatric surgery has been recommended and is being performed today.      Findings:  Morbid Obesity    Operative Report:    The patient is brought to the operating room placed in the supine position and given general endotracheal anesthesia.  She is sterilely prepped and draped in the usual surgical fashion.  A timeout was undertaken before starting the surgery.    A 12 mm trocar was advanced into the supraumbilical area under direct visualization of the surgery laparoscope and taken into the intra-peritoneal cavity.  A pneumoperitoneum was brought up to 15 mmHg. I placed a 5 mm trocar in the subxiphoid position removed the trocar and replaced with a Anne liver retractor which was then attached to the iron intern to stabilize and retract the left lobe of the liver up and away from the upper aspect of the stomach.  A 5 mm trochar was placed in the right upper quadrant a 15 millimeter trocar in the right supraumbilical region and another 5 mm trochars placed in the left upper abdomen all under direct visualization of the laparoscope.    The Esophageal Hiatus was dissected along the L side of the Kemar.  The gastro-Leinal ligament was taken down off of the greater curvature of the stomach with the Ligasure Device.  I started 7 cm proximal to the pylorus and extended this dissection right up to the L Kemar.  I lifted the stomach and took down all attachments within the lesser sac to completely mobilized the stomach except for its attachments along the lesser curve.  A 40 Frisian bougie was advanced under direct visualization of the laparoscope.  I started stapling parallel to the lesser curvature of the stomach staying to the lateral side of the bougie.  The distal staple loads a black  load with seamgaurd, the more proximal was a green and then blue staple loads with seamgaurd.  The staple line is then carefully evaluated areas that are oozing and any oozing vessels were further reinforced with an extra-large clip.      The stomach was grasped with a  was brought out through the 15 mm periumbilical trocar site.  Once the stomach had been extracted from the abdominal cavity I then used a fascial closure device to reapproximate the fascial opening.  The suture used was a figure-of-eight 0 Vicryl suture.       The abdominal cavity was evaluated for hemostasis and found to be clean and dry.  Pneumoperitoneum was removed after taking out the Anne liver retractor the trochars were removed and each of the trocar sites were closed with 4-0 subcuticular Monocryl sutures.  The wounds are dressed with Telfa and Tegaderm the patient was extubated taken to recovery there is no complications with this procedure      Estimated Blood Loss:   5 cc    Complications:    None    Jeffery Vanegas MD     Date: 2/28/2022  Time: 10:20 AM

## 2022-02-28 NOTE — H&P
HISTORY AND PHYSICAL UPDATE:    I have assessed the patient and evaluated the chart and and verify the patient's clinical status has not changed since our last documentation.  The patient is ready to move forward with the planned surgery.  Lungs: Clear to auscultation  Heart: Regular rate and rhythm    HPI: Esther Nair is a 41 year old female here today for consideration of metabolic and bariatric surgery. she has had weight problems since having her first child.  She has 2 children.   she Has tried multiple weight loss programs in the past with poor success.    she carries most of his/her obesity in through their abdomen, and hips.   This pt does not have Hypertention.   This pt does not have GERD.  She has occasional symptoms that she uses antacids for, once or twice a months.   The pt does not have Sleep Apnea.   This pt does not have diabetes.       Allergies:Patient has no known allergies.     Past Medical History        Past Medical History:   Diagnosis Date     Arthritis       hips.     Asthma       rare wheezing, may be dog related. if flare up uses inhaler.     Depression       no hospitalizations or suicide attempts.     H/O hemorrhoids       rare bleeding.     Hydradenitis              Past Surgical History         Past Surgical History:   Procedure Laterality Date     COLONOSCOPY         polyps, due for recheck 2023.     HYSTERECTOMY   2018     SPINE SURGERY         TUBAL LIGATION   2005     UPPER GASTROINTESTINAL ENDOSCOPY                CURRENT MEDS:        Family History         Family History   Problem Relation Age of Onset     Bone Cancer Father       Pancreatic Cancer Father       Arthritis Mother       Obesity Mother       Hypertension Brother       Obesity Brother       Melanoma No family hx of               reports that she has never smoked. She has never used smokeless tobacco. She reports current alcohol use. She reports that she does not use drugs.     Review of Systems - 12 point Review  of Systems is negative except for the issues mentioned above.     PSYCHIATRIC: she has undergone a lifestyle assessment and has been deemed a good candidate for bariatric surgery by the psychologist.     Vitals: /78   Wt 107 kg (236 lb)   BMI 43.16 kg/m    BMI= Body mass index is 43.16 kg/m .      EXAM:  GENERAL: This is a well-developed 41 year old female who appears her stated age  HEAD & NECK: Grossly normal.  No palpable thyroid lesions  CARDIAC: RRR without murmur  CHEST/LUNG:  Clear to auscultation  ABDOMEN: Obese.  Nontender.  No hernias or masses appreciated.  LYMPHATIC:  No significant adenopathy appreciated.    EXTREMITIES: Grossly normal.  No evidence of chronic venous stasis.    NEUROLOGIC: Focally intact  INTEGUMENT: No open lesions or ulcers  PSYCHIATRIC: Normal affect. she has a good grasp on the nature of her obesity and the treatment options.     LABS:  No results found for: WBC, HGB, HCT, MCV, PLT  INR/Prothrombin Time  @LABRCNTIP(NA,K,CL,co2,bun,creatinine,labglom,glucose,calcium)@  No results found for: HGBA1C  No results found for: ALT, AST, GGT, ALKPHOS, BILITOT     Assessment/Plan: 41 year old female who is an excellent candidate for bariatric and metabolic surgery.  After a careful conversation with the patient it was decided that a  Laparoscopic Sleeve Gastrectomy. would be her best option. Especially given her history of smoking, though she has been successful in her attempt to quit, I would not recommend a RNY.    She is otherwise in good health and she is interested in the Same Day Surgery Program.    Give her BMI of 41 a one week pre-op diet would be appropriate.        I went over the surgery in detail with her.  I went over the nature of the operation and some of the potential consequences of the surgery.  I went over the expected hospital course and discussed laparoscopic versus open surgery, understanding that we will plan on doing this laparoscopically with the possibility  of having to convert to an open operation.  I went over some of the risks and complications of the operation including, but not limited to, DVT, pulmonary emboli, pneumonia, postoperative bleeding, wound infection, staple line leak, intra-abdominal sepsis, and possible death.  I also went over some of the potential nutritional concerns such as vitamin B-12, iron, vitamin D, vitamin A, calcium and protein deficiencies.  I will also went over the need for lifelong nutritional surveillance.  The patient understands and wants to proceed with surgery.  We will submit for prior authorization.        Jeffery Vanegas  [facility], [facility]; surgeons  [phone]

## 2022-02-28 NOTE — ANESTHESIA PREPROCEDURE EVALUATION
Anesthesia Pre-Procedure Evaluation    Patient: Esther Nair   MRN: [id number] : 1980        Procedure : Procedure(s):  GASTRECTOMY, SLEEVE, LAPAROSCOPIC          Past Medical History:   Diagnosis Date     Arthritis     hips.     Asthma     rare wheezing, may be dog related. if flare up uses inhaler.     Depression     no hospitalizations or suicide attempts.     Depression      Gastroesophageal reflux disease      H/O hemorrhoids     rare bleeding.     Hidradenitis suppurativa      Hydradenitis      Morbid obesity (H)       Past Surgical History:   Procedure Laterality Date     COLONOSCOPY      polyps, due for recheck .     HYSTERECTOMY  2018     SPINE SURGERY       TUBAL LIGATION  2005     UPPER GASTROINTESTINAL ENDOSCOPY        No Known Allergies   Social History     Tobacco Use     Smoking status: Former Smoker     Packs/day: 0.25     Quit date: 10/17/2021     Years since quittin.3     Smokeless tobacco: Never Used   Substance Use Topics     Alcohol use: Yes     Comment: 5 beers per month      Wt Readings from Last 1 Encounters:   22 101.2 kg (223 lb)        Anesthesia Evaluation   Pt has had prior anesthetic. Type: General.        ROS/MED HX  ENT/Pulmonary: Comment: Recovered covid 22, mild sx, no hospitalization.  - neg pulmonary ROS   (+) tobacco use, Past use, asthma     Neurologic:  - neg neurologic ROS     Cardiovascular:  - neg cardiovascular ROS     METS/Exercise Tolerance:     Hematologic:  - neg hematologic  ROS     Musculoskeletal:  - neg musculoskeletal ROS     GI/Hepatic:  - neg GI/hepatic ROS   (+) GERD,     Renal/Genitourinary:  - neg Renal ROS     Endo:     (+) Obesity (bmi 41),     Psychiatric/Substance Use:  - neg psychiatric ROS     Infectious Disease:  - neg infectious disease ROS     Malignancy:  - neg malignancy ROS     Other:  - neg other ROS          Physical Exam    Airway  airway exam normal      Mallampati: II   TM distance: > 3 FB   Neck ROM: full   Mouth  opening: > 3 cm    Respiratory Devices and Support         Dental  no notable dental history         Cardiovascular   cardiovascular exam normal          Pulmonary   pulmonary exam normal                OUTSIDE LABS:  CBC: No results found for: WBC, HGB, HCT, PLT  BMP: No results found for: NA, POTASSIUM, CHLORIDE, CO2, BUN, CR, GLC  COAGS: No results found for: PTT, INR, FIBR  POC: No results found for: BGM, HCG, HCGS  HEPATIC: No results found for: ALBUMIN, PROTTOTAL, ALT, AST, GGT, ALKPHOS, BILITOTAL, BILIDIRECT, DORA  OTHER: No results found for: PH, LACT, A1C, GUMARO, PHOS, MAG, LIPASE, AMYLASE, TSH, T4, T3, CRP, SED    Anesthesia Plan    ASA Status:  3   NPO Status:  NPO Appropriate    Anesthesia Type: General.     - Airway: ETT   Induction: Propofol, Intravenous.   Maintenance: TIVA.        Consents    Anesthesia Plan(s) and associated risks, benefits, and realistic alternatives discussed. Questions answered and patient/representative(s) expressed understanding.     - Discussed: Risks, Benefits and Alternatives for BOTH SEDATION and the PROCEDURE were discussed     - Discussed with:  Patient         Postoperative Care    Pain management: Multi-modal analgesia, Peripheral nerve block (Single Shot).   PONV prophylaxis: Ondansetron (or other 5HT-3), Dexamethasone or Solumedrol     Comments:    Other Comments: Reviewed anesthetic options and risks, including risk of dental trauma. Patient agrees to proceed.     Labs reviewed.  Imaging reviewed.              Espinoza Barragan MD

## 2022-02-28 NOTE — ANESTHESIA CARE TRANSFER NOTE
Patient: Esther Nair    Procedure: Procedure(s):  GASTRECTOMY, SLEEVE, LAPAROSCOPIC       Diagnosis: Morbid obesity (H) [E66.01]  Diagnosis Additional Information: No value filed.    Anesthesia Type:   General     Note:    Oropharynx: oropharynx clear of all foreign objects  Level of Consciousness: awake  Oxygen Supplementation: face mask  Level of Supplemental Oxygen (L/min / FiO2): 10  Independent Airway: airway patency satisfactory and stable  Dentition: dentition unchanged  Vital Signs Stable: post-procedure vital signs reviewed and stable  Report to RN Given: handoff report given  Patient transferred to: PACU    Handoff Report: Identifed the Patient, Identified the Reponsible Provider, Reviewed the pertinent medical history, Discussed the surgical course, Reviewed Intra-OP anesthesia mangement and issues during anesthesia, Set expectations for post-procedure period and Allowed opportunity for questions and acknowledgement of understanding      Vitals:  Vitals Value Taken Time   /85 02/28/22 1015   Temp 36.1  C (97  F) 02/28/22 1015   Pulse 86 02/28/22 1016   Resp 25 02/28/22 1016   SpO2 100 % 02/28/22 1016   Vitals shown include unvalidated device data.    Electronically Signed By: MIHIR LY CRNA  February 28, 2022  10:18 AM

## 2022-02-28 NOTE — ANESTHESIA POSTPROCEDURE EVALUATION
Patient: Esther Nair    Procedure: Procedure(s):  GASTRECTOMY, SLEEVE, LAPAROSCOPIC       Anesthesia Type:  General    Note:  Disposition: Outpatient   Postop Pain Control: Uneventful            Sign Out: Well controlled pain   PONV: No   Neuro/Psych: Uneventful            Sign Out: Acceptable/Baseline neuro status   Airway/Respiratory: Uneventful            Sign Out: Acceptable/Baseline resp. status   CV/Hemodynamics: Uneventful            Sign Out: Acceptable CV status; No obvious hypovolemia; No obvious fluid overload   Other NRE: NONE   DID A NON-ROUTINE EVENT OCCUR? No           Last vitals:  Vitals Value Taken Time   /96 02/28/22 1130   Temp 36.6  C (97.9  F) 02/28/22 1130   Pulse 81 02/28/22 1137   Resp 26 02/28/22 1137   SpO2 95 % 02/28/22 1137   Vitals shown include unvalidated device data.    Electronically Signed By: Espinoza Barragan MD  February 28, 2022  1:07 PM

## 2022-02-28 NOTE — PHARMACY-ADMISSION MEDICATION HISTORY
Pharmacy Note - Admission Medication History    Patient took Emend, APAP, gabapentin, ibuprofen at home this morning as instructed.   ______________________________________________________________________    Prior To Admission (PTA) med list completed and updated in EMR.       PTA Med List   Medication Sig Note Last Dose     albuterol (PROAIR HFA/PROVENTIL HFA/VENTOLIN HFA) 108 (90 Base) MCG/ACT inhaler INHALE 1 TO 2 PUFFS BY MOUTH EVERY 4 HOURS AS NEEDED FOR WHEEZING OR SHORTNESS OF BREATH  2022 at time     calcium citrate 250 MG TABS Take by mouth daily   2022     [] cephALEXin (KEFLEX) 500 MG capsule Take 1 capsule (500 mg) by mouth 2 times daily for 3 days       gabapentin (NEURONTIN) 250 MG/5ML solution Take 5 mLs (250 mg) by mouth 3 times daily For Post-op Pain  2022     hyoscyamine (LEVSIN/SL) 0.125 MG sublingual tablet Place 1 tablet (0.125 mg) under the tongue every 4 hours as needed for cramping 2022: Not started yet      multivitamin w/minerals (MULTI-VITAMIN) tablet Take 1 tablet by mouth daily  2022     omeprazole (PRILOSEC) 20 MG DR capsule Take 20 mg by mouth daily  2022     ondansetron (ZOFRAN-ODT) 4 MG ODT tab Take 1 tablet (4 mg) by mouth every 8 hours as needed for nausea       ursodiol (ACTIGALL) 300 MG capsule Take 1 capsule (300 mg) by mouth 2 times daily Take with warm water and swallow whole, do NOT open caspsule. Take for 6 months post-op. 2022: Not started yet      vitamin B-12 (CYANOCOBALAMIN) 100 MCG tablet Take 1,000 mcg by mouth daily  2022     vitamin D3 (CHOLECALCIFEROL) 50 mcg (2000 units) tablet Take 1 tablet by mouth daily  Past Month       Information source(s): Patient, Clinic records and [facility]    Method of interview communication: in-person    Patient was asked about OTC/herbal products specifically.  PTA med list reflects this.    Based on the pharmacist's assessment, the PTA med list information appears  reliable    Allergies were reviewed, assessed, and updated with the patient.      Medications available for use during hospital stay: albuterol.      Thank you for the opportunity to participate in the care of this patient.      Kendall Sahu [facility]     2/28/2022     8:00 AM

## 2022-02-28 NOTE — ANESTHESIA PROCEDURE NOTES
Airway       Patient location during procedure: OR       Procedure Start/Stop Times: 2/28/2022 9:04 AM  Staff -        CRNA: Pennie Hernandez APRN CRNA       Performed By: CRNAIndications and Patient Condition       Indications for airway management: carey-procedural       Induction type:intravenous       Mask difficulty assessment: 2 - vent by mask + OA or adjuvant +/- NMBA    Final Airway Details       Final airway type: endotracheal airway       Successful airway: ETT - single  Endotracheal Airway Details        Cuffed: yes       Successful intubation technique: direct laryngoscopy       DL Blade Type: Davidson 2       Grade View of Cords: 1       Adjucts: stylet and tooth guard       Position: Right       Measured from: gums/teeth       Secured at (cm): 22       Bite block used: None    Post intubation assessment        Placement verified by: capnometry, equal breath sounds and chest rise        Number of attempts at approach: 1       Number of other approaches attempted: 0       Secured with: silk tape       Ease of procedure: easy       Dentition: Intact and Unchanged

## 2022-02-28 NOTE — ANESTHESIA PROCEDURE NOTES
Erector spinae Procedure Note    Pre-Procedure   Staff -        Anesthesiologist:  Espinoza Barragan MD       Performed By: anesthesiologist       Location: pre-op       Procedure Start/Stop Times: 2/28/2022 8:25 AM and 2/28/2022 8:31 AM       Pre-Anesthestic Checklist: patient identified, IV checked, site marked, risks and benefits discussed, informed consent, monitors and equipment checked, pre-op evaluation, at physician/surgeon's request and post-op pain management  Timeout:       Correct Patient: Yes        Correct Procedure: Yes        Correct Site: Yes        Correct Position: Yes        Correct Laterality: Yes        Site Marked: Yes  Procedure Documentation  Procedure: Erector spinae       Diagnosis: POSTOP PAIN CONTROL PER SURGEON REQUEST       Laterality: bilateral       Patient Position: sitting       Patient Prep/Sterile Barriers: sterile gloves, mask       Skin prep: Chloraprep       Insertion Site: T7-8.       Needle Type: insulated and short bevel       Needle Gauge: 20.        Needle Length (Inches): 4        Ultrasound guided       1. Ultrasound was used to identify targeted nerve, plexus, vascular marker, or fascial plane and place a needle adjacent to it in real-time.       2. Ultrasound was used to visualize the spread of anesthetic in close proximity to the above referenced structure.       3. A permanent image is entered into the patient's record.       4. The visualized anatomic structures appeared normal.       5. There were no apparent abnormal pathologic findings.    Assessment/Narrative         The placement was negative for: blood aspirated, painful injection and site bleeding       Paresthesias: No.     Bolus given via needle..        Secured via.        Insertion/Infusion Method: Single Shot       Complications: none       Injection made incrementally with aspirations every 5 mL.    Medication(s) Administered   Bupivacaine 0.25% PF (Infiltration), 20 mL  Medication Administration Time:  2/28/2022 8:31 AM     Comments:  Bilateral blocks - 20 ml 0.25% bupi on each side. No apparent complications.

## 2022-03-01 ENCOUNTER — OFFICE VISIT (OUTPATIENT)
Dept: SURGERY | Facility: CLINIC | Age: 42
End: 2022-03-01
Payer: COMMERCIAL

## 2022-03-01 DIAGNOSIS — Z98.84 BARIATRIC SURGERY STATUS: Primary | ICD-10-CM

## 2022-03-01 PROCEDURE — 99024 POSTOP FOLLOW-UP VISIT: CPT | Performed by: DIETITIAN, REGISTERED

## 2022-03-01 NOTE — PROGRESS NOTES
Education was provided on the following:    You will remain on a full liquid diet until you follow up in the post op class with the dietician.  It is extremely important to follow the liquid diet to allow for optimal healing and to prevent food from becoming lodged at the pouch outlet.     Protein is an important part of the diet after surgery; therefore, it is necessary to drink fluids that are high in protein.  Proteins are building blocks that help you heal after surgery and help preserve your muscle mass while you are losing weight, aim for 40-50g daily for the first week      Including carbohydrates in the diet is also important after surgery to prevent your body from burning fat for energy (Ketosis).  These carbohydrates include: unsweetened applesauce, blended canned fruit (in its own juice), Stage I baby food fruit, thin cream of wheat, light yogurt (no fruit chunks), or 100% fruit juice diluted (50% juice/50% water).      Remember to take 1 Multivitamin with Iron 2 times daily and 1000 mcg of Sublingual B-12 daily.     Sip 48-64 ounces of liquid per day in addition to full liquid meals/supplements, increase liquids slowly using 1oz measuring cup. After day 4 you are able to drink normally.      After 1-2 weeks (1 week for RYGB or 2 weeks for LSG and DS) of the full liquid diet, you will advance to the pureed diet, as instructed.      Shelly Sheehan RD, LD

## 2022-03-01 NOTE — LETTER
3/1/2022         RE: Esther Nair  [address] [place] [address]        Dear Colleague,    Thank you for referring your patient, Esther Nair, to the [facility] SURGERY CLINIC AND BARIATRICS CARE [place]. Please see a copy of my visit note below.    Education was provided on the following:    You will remain on a full liquid diet until you follow up in the post op class with the dietician.  It is extremely important to follow the liquid diet to allow for optimal healing and to prevent food from becoming lodged at the pouch outlet.     Protein is an important part of the diet after surgery; therefore, it is necessary to drink fluids that are high in protein.  Proteins are building blocks that help you heal after surgery and help preserve your muscle mass while you are losing weight, aim for 40-50g daily for the first week      Including carbohydrates in the diet is also important after surgery to prevent your body from burning fat for energy (Ketosis).  These carbohydrates include: unsweetened applesauce, blended canned fruit (in its own juice), Stage I baby food fruit, thin cream of wheat, light yogurt (no fruit chunks), or 100% fruit juice diluted (50% juice/50% water).      Remember to take 1 Multivitamin with Iron 2 times daily and 1000 mcg of Sublingual B-12 daily.     Sip 48-64 ounces of liquid per day in addition to full liquid meals/supplements, increase liquids slowly using 1oz measuring cup. After day 4 you are able to drink normally.      After 1-2 weeks (1 week for RYGB or 2 weeks for LSG and DS) of the full liquid diet, you will advance to the pureed diet, as instructed.      Shelly Sheehan, RD, LD      Again, thank you for allowing me to participate in the care of your patient.        Sincerely,        Shelly Sheehan

## 2022-03-03 ENCOUNTER — TELEPHONE (OUTPATIENT)
Dept: SURGERY | Facility: CLINIC | Age: 42
End: 2022-03-03
Payer: COMMERCIAL

## 2022-03-03 LAB
PATH REPORT.COMMENTS IMP SPEC: NORMAL
PATH REPORT.COMMENTS IMP SPEC: NORMAL
PATH REPORT.FINAL DX SPEC: NORMAL
PATH REPORT.GROSS SPEC: NORMAL
PATH REPORT.MICROSCOPIC SPEC OTHER STN: NORMAL
PATH REPORT.RELEVANT HX SPEC: NORMAL
PHOTO IMAGE: NORMAL

## 2022-03-03 PROCEDURE — 88307 TISSUE EXAM BY PATHOLOGIST: CPT | Mod: 26 | Performed by: PATHOLOGY

## 2022-03-03 NOTE — TELEPHONE ENCOUNTER
Post-Op Phone Call  [facility]    Surgeon: Jeffery Vanegas M.D.  Date of Surgery: 2/28/2022  Discharge Date: 2/8/2022    Date/Time Called:   Date: 3/3/2022 Time: 3:05 PM   Attempt: First    Patient unavailable, message left to call [facility] with questions/problems? No    Pain Control:  Intensity: No Pain (0)  Duration/Location/Explain: Incisional pain at the incision that opened up in the hospital--pressure dressing was applied with a lot of pressure and end up with a lot bruising.   What makes it better/worse? Hurts with every movement, has tried ice. Is better today than it has been.    Medications:  Narcotic Use - Yes  Drug type: Gabapentin  Frequency: as needed. Is mostly having trouble sleeping.    Non-prescription pain control: Tylenol as needed now, hasn't taken it today.    Other medications currently taking: see med list.    Complete Multivitamin + Iron BID? Yes    Vitamin B12? sublingual daily    Incisions:  Drainage? clean and dry  Comment: dressings off now.    Intake/Output:  Fluid Intake(oz/day)? 32-48 oz  Fluid type? Propel    Heartburn? No  Counseling: Omeprazole daily  Nausea? Yes  Vomiting? No  Explain:     Voiding Frequency? 2/day --will increase her water intake today.    Voiding-Color/Amount? Good, looks normal.    Flatus? Yes    Bowel Movement?No BM since the first week of her liquid diet, will take dulcolax.     Are you using your incentive spirometer? If yes, how often? Didn't get one at the hospital at all, is taking deep breaths frequently.    Any fever type symptoms? No  Explain:     Walking activity?   Frequency/Type: Walking around the house.    In Preparation for Surgery:  On a scale of 1-5, with 5 being the highest, how well did the pre-op class prepare you for what actually happened in the hospital? 4  If you were unable to give us a 5, what could we have done to earn a 5? Everything was really rushed because she says  wanted to do her early so  there were a lot of people in the room getting her ready in a hurry!    Is there anything that you wish you would have known prior to surgery that you did not know? If yes, what? Didn't remember talking about the spinal injection in class. Wasn't given the IS at all.    On a scale of 1-5, with 5 being the highest, how was the service while you were in the hospital? 5  If you were unable to give us a 5, what could we have done to earn a 5?     Is/was there anyone in particular; nurse, aide, hospital staff, that did a great job and you would like us to recognize? If yes, whom. JEFFREY Naylor  What did they do? Really supportive during the rush of getting her ready in a hurry.    Would you recommend [facility] to others? Yes  If no, can you explain why?     Would you recommend [facility] to others?Yes  If no, can you explain why?      Thank you for your time. Please do not hesitate to call us with any questions or concerns.    Call completed by:   Estrellita Fermin RN, CBN  [facility]  P [phone]  F [phone]

## 2022-03-08 ENCOUNTER — ALLIED HEALTH/NURSE VISIT (OUTPATIENT)
Dept: SURGERY | Facility: CLINIC | Age: 42
End: 2022-03-08
Payer: COMMERCIAL

## 2022-03-08 DIAGNOSIS — Z98.84 BARIATRIC SURGERY STATUS: Primary | ICD-10-CM

## 2022-03-08 DIAGNOSIS — Z71.3 NUTRITIONAL COUNSELING: ICD-10-CM

## 2022-03-08 PROCEDURE — 99024 POSTOP FOLLOW-UP VISIT: CPT

## 2022-03-08 NOTE — PROGRESS NOTES
Time in: 10:00a   /Time out: 10:30a      Pt presents for 1 week post op dietitian follow up. Reports tolerating full liquids well. Denies n/v, constipation/diarrhea, or significant pain. Taking MVI and SL B12 appropriately. Taking 50 oz fluid/day and 57g protein shake. Educated pt on pureed and soft to bariatric regular diets. Provided grocery list and sample meal plan for each diet stage.  Pt will begin pureed diet on 3-15-22 and advance as tolerated to softs/bariatric regular on 4-6-22. Instructed pt to begin 500 mg calcium citrate BID and 5000IU Vitamin D3 on 4-6-22. Pt to follow up with RD at 3 months post op.

## 2022-03-11 ENCOUNTER — VIRTUAL VISIT (OUTPATIENT)
Dept: SURGERY | Facility: CLINIC | Age: 42
End: 2022-03-11
Payer: COMMERCIAL

## 2022-03-11 DIAGNOSIS — Z98.84 STATUS POST LAPAROSCOPIC SLEEVE GASTRECTOMY: Primary | ICD-10-CM

## 2022-03-11 PROCEDURE — 99024 POSTOP FOLLOW-UP VISIT: CPT | Performed by: SURGERY

## 2022-03-11 NOTE — LETTER
3/11/2022         RE: Esther Nair  [address] [place] [address]        Dear Colleague,    Thank you for referring your patient, Esther Nair, to the [facility] SURGERY CLINIC AND BARIATRICS CARE [place]. Please see a copy of my visit note below.    Esther Nair is 41 year old  female who presents for a billable video visit today.    How would you like to obtain your AVS? MyChart  If dropped from the video visit, the video invitation should be resent by: Text to cell phone: [phone]  Will anyone else be joining your video visit? No      Video Start Time: 8:33 AM    Are there any specific questions or needs that you would like addressed at your visit today? Struggling to have a BM    Provider Notes:   HPI: Pt is here for follow up of a Laparoscopic Sleeve Gastrectomy. she is doing well, she is back to work and feeling comfortable in environment.  She does experience upper abdominal pain particularly when eating tomato soup.  She is also struggling somewhat with constipation having bowel movements about every week.  She is somewhat prone to having constipation but these bowel movements are much further apart than she is used to.  We discussed use of laxatives to help with more frequent bowel movements.. Taking po well. No vomiting. No fevers or chills. Ambulating without problems.       She has lost 14 pounds since surgery and 20 pounds since her preoperative visit.  EXAM:  GENERAL:Appears well  ABDOMEN: Incisions healing well      Assessment/Plan: Pt s/p Laparoscopic Sleeve Gastrectomy. Doing well, she is contending with some constipation and some esophageal spasm but overall she is doing exceptionally well.. Diet and activity discussed. she will f/u with us at the Bariatric program as per our routine after a laparoscopic sleeve gastrectomy..    Jeffery Vanegas MD ,MD  [facility] Department of Surgery      Video-Visit Details    Type of service:  Video Visit    Video  End Time (time video stopped): 8:46 AM  Originating Location (pt. Location): Home    Distant Location (provider location):  [facility] SURGERY [facility] AND BARIATRICS [facility]     Platform used for Video Visit: [employer]      Again, thank you for allowing me to participate in the care of your patient.        Sincerely,        Jeffery Vanegas MD

## 2022-03-11 NOTE — PROGRESS NOTES
Esther Nair is 41 year old  female who presents for a billable video visit today.    How would you like to obtain your AVS? MyChart  If dropped from the video visit, the video invitation should be resent by: Text to cell phone: [phone]  Will anyone else be joining your video visit? No      Video Start Time: 8:33 AM    Are there any specific questions or needs that you would like addressed at your visit today? Struggling to have a BM    Provider Notes:   HPI: Pt is here for follow up of a Laparoscopic Sleeve Gastrectomy. she is doing well, she is back to work and feeling comfortable in environment.  She does experience upper abdominal pain particularly when eating tomato soup.  She is also struggling somewhat with constipation having bowel movements about every week.  She is somewhat prone to having constipation but these bowel movements are much further apart than she is used to.  We discussed use of laxatives to help with more frequent bowel movements.. Taking po well. No vomiting. No fevers or chills. Ambulating without problems.       She has lost 14 pounds since surgery and 20 pounds since her preoperative visit.  EXAM:  GENERAL:Appears well  ABDOMEN: Incisions healing well      Assessment/Plan: Pt s/p Laparoscopic Sleeve Gastrectomy. Doing well, she is contending with some constipation and some esophageal spasm but overall she is doing exceptionally well.. Diet and activity discussed. she will f/u with us at the Bariatric program as per our routine after a laparoscopic sleeve gastrectomy..    Jeffery Vanegas MD ,MD  [facility] Department of Surgery      Video-Visit Details    Type of service:  Video Visit    Video End Time (time video stopped): 8:46 AM  Originating Location (pt. Location): Home    Distant Location (provider location):  [facility] SURGERY CLINIC AND BARIATRICS CARE [place]     Platform used for Video Visit: [employer]

## 2022-03-28 ENCOUNTER — TELEPHONE (OUTPATIENT)
Dept: SURGERY | Facility: CLINIC | Age: 42
End: 2022-03-28

## 2022-03-28 NOTE — TELEPHONE ENCOUNTER
Sent video link via text and attempted to call for video visit at 10:00a. Unable to leave a message at this time.

## 2022-09-17 ENCOUNTER — HEALTH MAINTENANCE LETTER (OUTPATIENT)
Age: 42
End: 2022-09-17

## 2023-08-15 NOTE — LETTER
12/22/2021         RE: Esther Nair  [address] [place] [address]        Dear Colleague,    Thank you for referring your patient, Esther Nair, to the [facility] SURGERY CLINIC AND BARIATRICS CARE [place]. Please see a copy of my visit note below.    I was consulted by Beba Oneal to evaluate this pt for Bariatric Surgery.    HPI: Esther Nair is a 41 year old female here today for consideration of metabolic and bariatric surgery. she has had weight problems since having her first child.  She has 2 children.   she Has tried multiple weight loss programs in the past with poor success.    she carries most of his/her obesity in through their abdomen, and hips.   This pt does not have Hypertention.   This pt does not have GERD.  She has occasional symptoms that she uses antacids for, once or twice a months.   The pt does not have Sleep Apnea.   This pt does not have diabetes.  .    .  .      Allergies:Patient has no known allergies.    Past Medical History:   Diagnosis Date     Arthritis     hips.     Asthma     rare wheezing, may be dog related. if flare up uses inhaler.     Depression     no hospitalizations or suicide attempts.     H/O hemorrhoids     rare bleeding.     Hydradenitis        Past Surgical History:   Procedure Laterality Date     COLONOSCOPY      polyps, due for recheck 2023.     HYSTERECTOMY  2018     SPINE SURGERY       TUBAL LIGATION  2005     UPPER GASTROINTESTINAL ENDOSCOPY         CURRENT MEDS:      Family History   Problem Relation Age of Onset     Bone Cancer Father      Pancreatic Cancer Father      Arthritis Mother      Obesity Mother      Hypertension Brother      Obesity Brother      Melanoma No family hx of         reports that she has never smoked. She has never used smokeless tobacco. She reports current alcohol use. She reports that she does not use drugs.    Review of Systems - 12 point Review of Systems is negative except for the issues  mentioned above.    PSYCHIATRIC: she has undergone a lifestyle assessment and has been deemed a good candidate for bariatric surgery by the psychologist.    Vitals: /78   Wt 107 kg (236 lb)   BMI 43.16 kg/m    BMI= Body mass index is 43.16 kg/m .     EXAM:  GENERAL: This is a well-developed 41 year old female who appears her stated age  HEAD & NECK: Grossly normal.  No palpable thyroid lesions  CARDIAC: RRR without murmur  CHEST/LUNG:  Clear to auscultation  ABDOMEN: Obese.  Nontender.  No hernias or masses appreciated.  LYMPHATIC:  No significant adenopathy appreciated.    EXTREMITIES: Grossly normal.  No evidence of chronic venous stasis.    NEUROLOGIC: Focally intact  INTEGUMENT: No open lesions or ulcers  PSYCHIATRIC: Normal affect. she has a good grasp on the nature of her obesity and the treatment options.    LABS:  No results found for: WBC, HGB, HCT, MCV, PLT  INR/Prothrombin Time  @LABRCNTIP(NA,K,CL,co2,bun,creatinine,labglom,glucose,calcium)@  No results found for: HGBA1C  No results found for: ALT, AST, GGT, ALKPHOS, BILITOT    Assessment/Plan: 41 year old female who is an excellent candidate for bariatric and metabolic surgery.  After a careful conversation with the patient it was decided that a  Laparoscopic Sleeve Gastrectomy. would be her best option. Especially given her history of smoking, though she has been successful in her attempt to quit, I would not recommend a RNY.    She is otherwise in good health and she is interested in the Same Day Surgery Program.    Give her BMI of 41 a one week pre-op diet would be appropriate.      I went over the surgery in detail with her.  I went over the nature of the operation and some of the potential consequences of the surgery.  I went over the expected hospital course and discussed laparoscopic versus open surgery, understanding that we will plan on doing this laparoscopically with the possibility of having to convert to an open operation.  I went  over some of the risks and complications of the operation including, but not limited to, DVT, pulmonary emboli, pneumonia, postoperative bleeding, wound infection, staple line leak, intra-abdominal sepsis, and possible death.  I also went over some of the potential nutritional concerns such as vitamin B-12, iron, vitamin D, vitamin A, calcium and protein deficiencies.  I will also went over the need for lifelong nutritional surveillance.  The patient understands and wants to proceed with surgery.  We will submit for prior authorization.      Jeffery Vanegas MD ,MD  [facility] Department of Surgery  [phone]      Again, thank you for allowing me to participate in the care of your patient.        Sincerely,        Jeffery Vanegas MD     no

## 2023-10-08 ENCOUNTER — HEALTH MAINTENANCE LETTER (OUTPATIENT)
Age: 43
End: 2023-10-08

## 2024-02-25 ENCOUNTER — HEALTH MAINTENANCE LETTER (OUTPATIENT)
Age: 44
End: 2024-02-25

## 2024-11-30 ENCOUNTER — HEALTH MAINTENANCE LETTER (OUTPATIENT)
Age: 44
End: 2024-11-30

## (undated) DEVICE — TUBING SUCTION MEDI-VAC 1/4"X20' N620A - HE

## (undated) DEVICE — DRSG TEGADERM 2 1/2X 2 3/4"

## (undated) DEVICE — SUCTION MANIFOLD NEPTUNE 2 SYS 1 PORT 702-025-000

## (undated) DEVICE — SYR 30ML LL W/O NDL 302832

## (undated) DEVICE — POSITIONER ARM BOARD FOAM 7.5X20X2"

## (undated) DEVICE — ADHESIVE SEAMGUARD BIO STAPLE LINE EC60A

## (undated) DEVICE — ENDO TROCAR OPTICAL ACCESS KII Z-THRD 15X100MM C0R37

## (undated) DEVICE — SYSTEM CLEARIFY VISUALIZATION 21-345

## (undated) DEVICE — ENDO TROCAR SLEEVE KII Z-THREADED 05X100MM CTS02

## (undated) DEVICE — SOL WATER IRRIG 1000ML BOTTLE 2F7114

## (undated) DEVICE — CUSTOM PACK LAP CHOLE SBA5BLCHEA

## (undated) DEVICE — TAPE ADH POROUS 3IN CURITY STD 7046C

## (undated) DEVICE — NEEDLE SPINAL DISP 22GA X 3.5" QUINCKE 333320

## (undated) DEVICE — SOL NACL 0.9% IRRIG 1000ML BOTTLE 2F7124

## (undated) DEVICE — TUBING SMOKE EVAC PNEUMOCLEAR HIGH FLOW 0620050250

## (undated) DEVICE — STPL RELOAD REG TISSUE ECHELON 60 X 3.6MM BLUE GST60B

## (undated) DEVICE — DRSG TEGADERM 2 3/8X2 3/4" 1624W

## (undated) DEVICE — BLADE KNIFE SURG 11 371111

## (undated) DEVICE — DRAPE SHEET REV FOLD 3/4 9349

## (undated) DEVICE — GOWN IMPERVIOUS BREATHABLE SMART LG 89015

## (undated) DEVICE — STPL RELOAD LINEAR CUT ENDOPATH ECHELON 60MM BLK GST60T

## (undated) DEVICE — PLATE GROUNDING ADULT W/CORD 9165L

## (undated) DEVICE — PREP DURAPREP 26ML APL 8630

## (undated) DEVICE — GOWN SURGICAL SMARTGOWN 2XL 89075

## (undated) DEVICE — SU VICRYL+ 0 27 UR6 VLT VCP603H

## (undated) DEVICE — ENDO TROCAR OPTICAL ACCESS KII Z-THRD 05X100MM CTR03

## (undated) DEVICE — SU MONOCRYL+ 4-0 18IN PS2 UND MCP496G

## (undated) DEVICE — ESU LIGASURE MARYLAND LAPAROSCOPIC SLR/DVDR 5MMX37CM LF1937

## (undated) DEVICE — ENDO SHEARS RENEW LAP ENDOCUT SCISSOR TIP 16.5MM 3142

## (undated) DEVICE — GLOVE BIOGEL PI ULTRATOUCH G SZ 8.0 42180

## (undated) DEVICE — DRSG TELFA 3X4" 1050

## (undated) DEVICE — STPL POWERED ECHELON LONG 60MM PLEE60A

## (undated) DEVICE — Device

## (undated) DEVICE — GLOVE BIOGEL PI ULTRATOUCH G SZ 6.5 42165

## (undated) RX ORDER — FENTANYL CITRATE 50 UG/ML
INJECTION, SOLUTION INTRAMUSCULAR; INTRAVENOUS
Status: DISPENSED
Start: 2022-02-28

## (undated) RX ORDER — DEXAMETHASONE SODIUM PHOSPHATE 10 MG/ML
INJECTION, EMULSION INTRAMUSCULAR; INTRAVENOUS
Status: DISPENSED
Start: 2022-02-28

## (undated) RX ORDER — BUPIVACAINE HYDROCHLORIDE 2.5 MG/ML
INJECTION, SOLUTION INFILTRATION; PERINEURAL
Status: DISPENSED
Start: 2022-02-28

## (undated) RX ORDER — LIDOCAINE HYDROCHLORIDE AND EPINEPHRINE 10; 10 MG/ML; UG/ML
INJECTION, SOLUTION INFILTRATION; PERINEURAL
Status: DISPENSED
Start: 2022-02-28

## (undated) RX ORDER — KETAMINE HYDROCHLORIDE 10 MG/ML
INJECTION INTRAMUSCULAR; INTRAVENOUS
Status: DISPENSED
Start: 2022-02-28

## (undated) RX ORDER — BUPIVACAINE HYDROCHLORIDE 2.5 MG/ML
INJECTION, SOLUTION EPIDURAL; INFILTRATION; INTRACAUDAL
Status: DISPENSED
Start: 2022-02-28

## (undated) RX ORDER — LABETALOL HYDROCHLORIDE 5 MG/ML
INJECTION, SOLUTION INTRAVENOUS
Status: DISPENSED
Start: 2022-02-28